# Patient Record
Sex: FEMALE | Race: WHITE | HISPANIC OR LATINO | URBAN - METROPOLITAN AREA
[De-identification: names, ages, dates, MRNs, and addresses within clinical notes are randomized per-mention and may not be internally consistent; named-entity substitution may affect disease eponyms.]

---

## 2022-09-14 ENCOUNTER — APPOINTMENT (OUTPATIENT)
Dept: ULTRASOUND IMAGING | Facility: CLINIC | Age: 57
DRG: 418 | End: 2022-09-14
Attending: EMERGENCY MEDICINE
Payer: COMMERCIAL

## 2022-09-14 ENCOUNTER — OFFICE VISIT (OUTPATIENT)
Dept: URGENT CARE | Facility: URGENT CARE | Age: 57
End: 2022-09-14
Payer: COMMERCIAL

## 2022-09-14 ENCOUNTER — HOSPITAL ENCOUNTER (INPATIENT)
Facility: CLINIC | Age: 57
LOS: 3 days | Discharge: HOME | DRG: 418 | End: 2022-09-17
Attending: EMERGENCY MEDICINE | Admitting: HOSPITALIST
Payer: COMMERCIAL

## 2022-09-14 VITALS — SYSTOLIC BLOOD PRESSURE: 138 MMHG | OXYGEN SATURATION: 98 % | DIASTOLIC BLOOD PRESSURE: 84 MMHG | HEART RATE: 63 BPM

## 2022-09-14 DIAGNOSIS — G89.18 ACUTE POST-OPERATIVE PAIN: Primary | ICD-10-CM

## 2022-09-14 DIAGNOSIS — R10.13 ABDOMINAL PAIN, EPIGASTRIC: Primary | ICD-10-CM

## 2022-09-14 DIAGNOSIS — K80.50 CHOLEDOCHOLITHIASIS: ICD-10-CM

## 2022-09-14 DIAGNOSIS — K81.0 ACUTE CHOLECYSTITIS: ICD-10-CM

## 2022-09-14 LAB
ALBUMIN SERPL-MCNC: 3.3 G/DL (ref 3.4–5)
ALBUMIN SERPL-MCNC: 3.9 G/DL (ref 3.4–5)
ALBUMIN UR-MCNC: NEGATIVE MG/DL
ALP SERPL-CCNC: 172 U/L (ref 40–150)
ALP SERPL-CCNC: 203 U/L (ref 40–150)
ALT SERPL W P-5'-P-CCNC: 566 U/L (ref 0–50)
ALT SERPL W P-5'-P-CCNC: 675 U/L (ref 0–50)
ANION GAP SERPL CALCULATED.3IONS-SCNC: 6 MMOL/L (ref 3–14)
ANION GAP SERPL CALCULATED.3IONS-SCNC: 6 MMOL/L (ref 3–14)
APPEARANCE UR: CLEAR
AST SERPL W P-5'-P-CCNC: 220 U/L (ref 0–45)
AST SERPL W P-5'-P-CCNC: 277 U/L (ref 0–45)
BACTERIA #/AREA URNS HPF: ABNORMAL /HPF
BASOPHILS # BLD AUTO: 0 10E3/UL (ref 0–0.2)
BASOPHILS NFR BLD AUTO: 0 %
BILIRUB SERPL-MCNC: 5.6 MG/DL (ref 0.2–1.3)
BILIRUB SERPL-MCNC: 5.7 MG/DL (ref 0.2–1.3)
BILIRUB UR QL STRIP: ABNORMAL
BUN SERPL-MCNC: 17 MG/DL (ref 7–30)
BUN SERPL-MCNC: 19 MG/DL (ref 7–30)
CALCIUM SERPL-MCNC: 8.7 MG/DL (ref 8.5–10.1)
CALCIUM SERPL-MCNC: 9.5 MG/DL (ref 8.5–10.1)
CHLORIDE BLD-SCNC: 101 MMOL/L (ref 94–109)
CHLORIDE BLD-SCNC: 105 MMOL/L (ref 94–109)
CO2 SERPL-SCNC: 28 MMOL/L (ref 20–32)
CO2 SERPL-SCNC: 29 MMOL/L (ref 20–32)
COLOR UR AUTO: YELLOW
CREAT SERPL-MCNC: 1.32 MG/DL (ref 0.52–1.04)
CREAT SERPL-MCNC: 1.35 MG/DL (ref 0.52–1.04)
EOSINOPHIL # BLD AUTO: 0.2 10E3/UL (ref 0–0.7)
EOSINOPHIL NFR BLD AUTO: 2 %
ERYTHROCYTE [DISTWIDTH] IN BLOOD BY AUTOMATED COUNT: 13.7 % (ref 10–15)
GFR SERPL CREATININE-BSD FRML MDRD: 46 ML/MIN/1.73M2
GFR SERPL CREATININE-BSD FRML MDRD: 47 ML/MIN/1.73M2
GLUCOSE BLD-MCNC: 101 MG/DL (ref 70–99)
GLUCOSE BLD-MCNC: 114 MG/DL (ref 70–99)
GLUCOSE UR STRIP-MCNC: NEGATIVE MG/DL
HCT VFR BLD AUTO: 43.5 % (ref 35–47)
HGB BLD-MCNC: 14.4 G/DL (ref 11.7–15.7)
HGB UR QL STRIP: ABNORMAL
HOLD SPECIMEN: NORMAL
KETONES UR STRIP-MCNC: NEGATIVE MG/DL
LEUKOCYTE ESTERASE UR QL STRIP: NEGATIVE
LIPASE SERPL-CCNC: 117 U/L (ref 73–393)
LYMPHOCYTES # BLD AUTO: 1.6 10E3/UL (ref 0.8–5.3)
LYMPHOCYTES NFR BLD AUTO: 20 %
MCH RBC QN AUTO: 29.4 PG (ref 26.5–33)
MCHC RBC AUTO-ENTMCNC: 33.1 G/DL (ref 31.5–36.5)
MCV RBC AUTO: 89 FL (ref 78–100)
MONOCYTES # BLD AUTO: 0.6 10E3/UL (ref 0–1.3)
MONOCYTES NFR BLD AUTO: 8 %
NEUTROPHILS # BLD AUTO: 5.5 10E3/UL (ref 1.6–8.3)
NEUTROPHILS NFR BLD AUTO: 70 %
NITRATE UR QL: NEGATIVE
PH UR STRIP: 5.5 [PH] (ref 5–7)
PLATELET # BLD AUTO: 261 10E3/UL (ref 150–450)
POTASSIUM BLD-SCNC: 3.1 MMOL/L (ref 3.4–5.3)
POTASSIUM BLD-SCNC: 3.2 MMOL/L (ref 3.4–5.3)
PROT SERPL-MCNC: 7.1 G/DL (ref 6.8–8.8)
PROT SERPL-MCNC: 8.2 G/DL (ref 6.8–8.8)
RBC # BLD AUTO: 4.89 10E6/UL (ref 3.8–5.2)
RBC #/AREA URNS AUTO: ABNORMAL /HPF
SARS-COV-2 RNA RESP QL NAA+PROBE: NEGATIVE
SODIUM SERPL-SCNC: 135 MMOL/L (ref 133–144)
SODIUM SERPL-SCNC: 140 MMOL/L (ref 133–144)
SP GR UR STRIP: <=1.005 (ref 1–1.03)
SQUAMOUS #/AREA URNS AUTO: ABNORMAL /LPF
UROBILINOGEN UR STRIP-ACNC: 0.2 E.U./DL
WBC # BLD AUTO: 7.9 10E3/UL (ref 4–11)
WBC #/AREA URNS AUTO: ABNORMAL /HPF

## 2022-09-14 PROCEDURE — U0005 INFEC AGEN DETEC AMPLI PROBE: HCPCS | Performed by: EMERGENCY MEDICINE

## 2022-09-14 PROCEDURE — 76705 ECHO EXAM OF ABDOMEN: CPT

## 2022-09-14 PROCEDURE — C9803 HOPD COVID-19 SPEC COLLECT: HCPCS

## 2022-09-14 PROCEDURE — 96365 THER/PROPH/DIAG IV INF INIT: CPT

## 2022-09-14 PROCEDURE — 120N000001 HC R&B MED SURG/OB

## 2022-09-14 PROCEDURE — 85025 COMPLETE CBC W/AUTO DIFF WBC: CPT | Performed by: PHYSICIAN ASSISTANT

## 2022-09-14 PROCEDURE — 80053 COMPREHEN METABOLIC PANEL: CPT | Performed by: PHYSICIAN ASSISTANT

## 2022-09-14 PROCEDURE — 96361 HYDRATE IV INFUSION ADD-ON: CPT

## 2022-09-14 PROCEDURE — 258N000003 HC RX IP 258 OP 636: Performed by: EMERGENCY MEDICINE

## 2022-09-14 PROCEDURE — 83690 ASSAY OF LIPASE: CPT | Performed by: EMERGENCY MEDICINE

## 2022-09-14 PROCEDURE — 99285 EMERGENCY DEPT VISIT HI MDM: CPT | Mod: 25

## 2022-09-14 PROCEDURE — 250N000011 HC RX IP 250 OP 636: Performed by: EMERGENCY MEDICINE

## 2022-09-14 PROCEDURE — 36415 COLL VENOUS BLD VENIPUNCTURE: CPT | Performed by: PHYSICIAN ASSISTANT

## 2022-09-14 PROCEDURE — 84155 ASSAY OF PROTEIN SERUM: CPT | Performed by: EMERGENCY MEDICINE

## 2022-09-14 PROCEDURE — 99207 PR INPT ADMISSION FROM CLINIC: CPT | Performed by: PHYSICIAN ASSISTANT

## 2022-09-14 PROCEDURE — 99223 1ST HOSP IP/OBS HIGH 75: CPT | Mod: AI | Performed by: HOSPITALIST

## 2022-09-14 PROCEDURE — 36415 COLL VENOUS BLD VENIPUNCTURE: CPT | Performed by: EMERGENCY MEDICINE

## 2022-09-14 PROCEDURE — 81001 URINALYSIS AUTO W/SCOPE: CPT | Performed by: PHYSICIAN ASSISTANT

## 2022-09-14 PROCEDURE — 93000 ELECTROCARDIOGRAM COMPLETE: CPT | Performed by: PHYSICIAN ASSISTANT

## 2022-09-14 RX ORDER — PIPERACILLIN SODIUM, TAZOBACTAM SODIUM 3; .375 G/15ML; G/15ML
3.38 INJECTION, POWDER, LYOPHILIZED, FOR SOLUTION INTRAVENOUS ONCE
Status: COMPLETED | OUTPATIENT
Start: 2022-09-14 | End: 2022-09-14

## 2022-09-14 RX ORDER — SODIUM CHLORIDE 9 MG/ML
INJECTION, SOLUTION INTRAVENOUS CONTINUOUS
Status: DISCONTINUED | OUTPATIENT
Start: 2022-09-14 | End: 2022-09-15 | Stop reason: DRUGHIGH

## 2022-09-14 RX ORDER — LATANOPROST 50 UG/ML
1 SOLUTION/ DROPS OPHTHALMIC AT BEDTIME
COMMUNITY

## 2022-09-14 RX ORDER — LOSARTAN POTASSIUM AND HYDROCHLOROTHIAZIDE 12.5; 5 MG/1; MG/1
1 TABLET ORAL DAILY
Status: ON HOLD | COMMUNITY
Start: 2022-08-10 | End: 2022-09-17

## 2022-09-14 RX ADMIN — PIPERACILLIN AND TAZOBACTAM 3.38 G: 3; .375 INJECTION, POWDER, FOR SOLUTION INTRAVENOUS at 22:02

## 2022-09-14 RX ADMIN — SODIUM CHLORIDE: 9 INJECTION, SOLUTION INTRAVENOUS at 23:25

## 2022-09-14 RX ADMIN — SODIUM CHLORIDE 1000 ML: 9 INJECTION, SOLUTION INTRAVENOUS at 21:13

## 2022-09-14 ASSESSMENT — ENCOUNTER SYMPTOMS
SORE THROAT: 0
ABDOMINAL PAIN: 1
NAUSEA: 0
FEVER: 0
VOMITING: 0
BLOOD IN STOOL: 0
COUGH: 0

## 2022-09-14 ASSESSMENT — ACTIVITIES OF DAILY LIVING (ADL)
ADLS_ACUITY_SCORE: 35
ADLS_ACUITY_SCORE: 33

## 2022-09-14 NOTE — PROGRESS NOTES
SUBJECTIVE  HPI:  Carla Jennings is a 57 year old female who presents with the CC of abdominal/pelvic pain.    Pain is located in the epigastric area, with radiation to RUQ and LUQ.  The pain is characterized as burning, and at worst is a level 7 on a scale of 1-10.  Pain has been present for 3 day(s) and is stable.  EXACERBATING FACTORS: eating  RELIEVING FACTORS: nothing.  ASSOCIATED SX: none.    No past medical history on file.  Current Outpatient Medications   Medication Sig Dispense Refill     losartan-hydrochlorothiazide (HYZAAR) 50-12.5 MG tablet Take 1 tablet by mouth daily       Social History     Tobacco Use     Smoking status: Not on file     Smokeless tobacco: Not on file   Substance Use Topics     Alcohol use: Not on file       ROS:  Review of systems negative except as stated above.    OBJECTIVE:  /84 (BP Location: Left arm, Patient Position: Sitting, Cuff Size: Adult Regular)   Pulse 63   SpO2 98%   GENERAL APPEARANCE: healthy, alert and no distress  EYES: EOMI,  PERRL, conjunctiva clear  HENT: ear canals and TM's normal.  Nose and mouth without ulcers, erythema or lesions  NECK: supple, nontender, no lymphadenopathy  RESP: lungs clear to auscultation - no rales, rhonchi or wheezes  CV: regular rates and rhythm, normal S1 S2, no murmur noted  ABDOMEN:  RUQ guarding.  Otherwise soft, nontender, no HSM or masses and bowel sounds normal  NEURO: Normal strength and tone, sensory exam grossly normal,  normal speech and mentation  SKIN: no suspicious lesions or rashes      Results for orders placed or performed in visit on 09/14/22   UA macro with reflex to Microscopic and Culture - Clinc Collect     Status: Abnormal    Specimen: Urine, Midstream   Result Value Ref Range    Color Urine Yellow Colorless, Straw, Light Yellow, Yellow    Appearance Urine Clear Clear    Glucose Urine Negative Negative mg/dL    Bilirubin Urine Moderate (A) Negative    Ketones Urine Negative Negative mg/dL    Specific  Gravity Urine <=1.005 1.003 - 1.035    Blood Urine Trace (A) Negative    pH Urine 5.5 5.0 - 7.0    Protein Albumin Urine Negative Negative mg/dL    Urobilinogen Urine 0.2 0.2, 1.0 E.U./dL    Nitrite Urine Negative Negative    Leukocyte Esterase Urine Negative Negative   Comprehensive metabolic panel (BMP + Alb, Alk Phos, ALT, AST, Total. Bili, TP)     Status: Abnormal   Result Value Ref Range    Sodium 135 133 - 144 mmol/L    Potassium 3.2 (L) 3.4 - 5.3 mmol/L    Chloride 101 94 - 109 mmol/L    Carbon Dioxide (CO2) 28 20 - 32 mmol/L    Anion Gap 6 3 - 14 mmol/L    Urea Nitrogen 17 7 - 30 mg/dL    Creatinine 1.32 (H) 0.52 - 1.04 mg/dL    Calcium 9.5 8.5 - 10.1 mg/dL    Glucose 101 (H) 70 - 99 mg/dL    Alkaline Phosphatase 203 (H) 40 - 150 U/L     (H) 0 - 45 U/L     (HH) 0 - 50 U/L    Protein Total 8.2 6.8 - 8.8 g/dL    Albumin 3.9 3.4 - 5.0 g/dL    Bilirubin Total 5.7 (H) 0.2 - 1.3 mg/dL    GFR Estimate 47 (L) >60 mL/min/1.73m2   CBC with platelets and differential     Status: None   Result Value Ref Range    WBC Count 7.9 4.0 - 11.0 10e3/uL    RBC Count 4.89 3.80 - 5.20 10e6/uL    Hemoglobin 14.4 11.7 - 15.7 g/dL    Hematocrit 43.5 35.0 - 47.0 %    MCV 89 78 - 100 fL    MCH 29.4 26.5 - 33.0 pg    MCHC 33.1 31.5 - 36.5 g/dL    RDW 13.7 10.0 - 15.0 %    Platelet Count 261 150 - 450 10e3/uL    % Neutrophils 70 %    % Lymphocytes 20 %    % Monocytes 8 %    % Eosinophils 2 %    % Basophils 0 %    Absolute Neutrophils 5.5 1.6 - 8.3 10e3/uL    Absolute Lymphocytes 1.6 0.8 - 5.3 10e3/uL    Absolute Monocytes 0.6 0.0 - 1.3 10e3/uL    Absolute Eosinophils 0.2 0.0 - 0.7 10e3/uL    Absolute Basophils 0.0 0.0 - 0.2 10e3/uL   Urine Microscopic     Status: Abnormal   Result Value Ref Range    Bacteria Urine Few (A) None Seen /HPF    RBC Urine 0-2 0-2 /HPF /HPF    WBC Urine 0-5 0-5 /HPF /HPF    Squamous Epithelials Urine Moderate (A) None Seen /LPF    Narrative    Urine Culture not indicated   CBC with platelets and  differential     Status: None    Narrative    The following orders were created for panel order CBC with platelets and differential.  Procedure                               Abnormality         Status                     ---------                               -----------         ------                     CBC with platelets and d...[[id number]]                      Final result                 Please view results for these tests on the individual orders.       ASSESSMENT:    (R10.13) Abdominal pain, epigastric  (primary encounter diagnosis)  Comment: multiple abnormalities in lab work indicating further assessment  Plan: lidocaine (viscous) (XYLOCAINE) 2 % 15 mL, alum        & mag hydroxide-simethicone (MAALOX) 15 mL GI         Cocktail, EKG 12-lead complete w/read -         Clinics, UA macro with reflex to Microscopic         and Culture - Clinc Collect, CBC with platelets        and differential, Comprehensive metabolic panel        (BMP + Alb, Alk Phos, ALT, AST, Total. Bili,         TP), Urine Microscopic      Sent to ED with  by private vehicle

## 2022-09-14 NOTE — PROGRESS NOTES
Clinic Administered Medication Documentation    Administrations This Visit     lidocaine (viscous) (XYLOCAINE) 2 % 15 mL, alum & mag hydroxide-simethicone (MAALOX) 15 mL GI Cocktail     Admin Date  09/14/2022 Action  Given Dose  30 mL Route  Oral Site   Administered By  Braulio Brown CMA    Ordering Provider: Edgardo Snider PA-C    NDC: 2301-3852-93, 85579-647-93    Lot#: [id number], [id number]    : CHUY RENTERIA    Patient Supplied?: No

## 2022-09-14 NOTE — ED TRIAGE NOTES
Pt seen at Urgent Care & sent here for abnormal labs. Pt has been having epigastric pain since Saturday. Pt was given GI cocktail for pain and bloodwork was drawn. Pt pain at this time 2/10. Pt hx of HTN      Triage Assessment     Row Name 09/14/22 1006       Triage Assessment (Adult)    Airway WDL WDL       Respiratory WDL    Respiratory WDL WDL       Skin Circulation/Temperature WDL    Skin Circulation/Temperature WDL WDL       Cardiac WDL    Cardiac WDL WDL       Peripheral/Neurovascular WDL    Peripheral Neurovascular WDL WDL       Cognitive/Neuro/Behavioral WDL    Cognitive/Neuro/Behavioral WDL WDL

## 2022-09-15 ENCOUNTER — ANESTHESIA EVENT (OUTPATIENT)
Dept: SURGERY | Facility: CLINIC | Age: 57
DRG: 418 | End: 2022-09-15
Payer: COMMERCIAL

## 2022-09-15 ENCOUNTER — ANESTHESIA (OUTPATIENT)
Dept: SURGERY | Facility: CLINIC | Age: 57
DRG: 418 | End: 2022-09-15
Payer: COMMERCIAL

## 2022-09-15 ENCOUNTER — APPOINTMENT (OUTPATIENT)
Dept: GENERAL RADIOLOGY | Facility: CLINIC | Age: 57
DRG: 418 | End: 2022-09-15
Attending: INTERNAL MEDICINE
Payer: COMMERCIAL

## 2022-09-15 LAB
ALBUMIN SERPL-MCNC: 3.2 G/DL (ref 3.4–5)
ALP SERPL-CCNC: 172 U/L (ref 40–150)
ALT SERPL W P-5'-P-CCNC: 532 U/L (ref 0–50)
ANION GAP SERPL CALCULATED.3IONS-SCNC: 6 MMOL/L (ref 3–14)
AST SERPL W P-5'-P-CCNC: 204 U/L (ref 0–45)
BILIRUB DIRECT SERPL-MCNC: 4.6 MG/DL (ref 0–0.2)
BILIRUB SERPL-MCNC: 5.9 MG/DL (ref 0.2–1.3)
BUN SERPL-MCNC: 18 MG/DL (ref 7–30)
CALCIUM SERPL-MCNC: 8.7 MG/DL (ref 8.5–10.1)
CHLORIDE BLD-SCNC: 110 MMOL/L (ref 94–109)
CO2 SERPL-SCNC: 24 MMOL/L (ref 20–32)
CREAT SERPL-MCNC: 1.35 MG/DL (ref 0.52–1.04)
ERYTHROCYTE [DISTWIDTH] IN BLOOD BY AUTOMATED COUNT: 13.8 % (ref 10–15)
GFR SERPL CREATININE-BSD FRML MDRD: 46 ML/MIN/1.73M2
GLUCOSE BLD-MCNC: 97 MG/DL (ref 70–99)
HCT VFR BLD AUTO: 39.8 % (ref 35–47)
HGB BLD-MCNC: 13.2 G/DL (ref 11.7–15.7)
MCH RBC QN AUTO: 29.2 PG (ref 26.5–33)
MCHC RBC AUTO-ENTMCNC: 33.2 G/DL (ref 31.5–36.5)
MCV RBC AUTO: 88 FL (ref 78–100)
PLATELET # BLD AUTO: 244 10E3/UL (ref 150–450)
POTASSIUM BLD-SCNC: 3.7 MMOL/L (ref 3.4–5.3)
POTASSIUM BLD-SCNC: 3.8 MMOL/L (ref 3.4–5.3)
PROT SERPL-MCNC: 7 G/DL (ref 6.8–8.8)
RBC # BLD AUTO: 4.52 10E6/UL (ref 3.8–5.2)
SODIUM SERPL-SCNC: 140 MMOL/L (ref 133–144)
WBC # BLD AUTO: 6.2 10E3/UL (ref 4–11)

## 2022-09-15 PROCEDURE — 250N000011 HC RX IP 250 OP 636: Performed by: PHYSICIAN ASSISTANT

## 2022-09-15 PROCEDURE — 360N000083 HC SURGERY LEVEL 3 W/ FLUORO, PER MIN: Performed by: INTERNAL MEDICINE

## 2022-09-15 PROCEDURE — 82248 BILIRUBIN DIRECT: CPT | Performed by: HOSPITALIST

## 2022-09-15 PROCEDURE — 250N000013 HC RX MED GY IP 250 OP 250 PS 637: Performed by: HOSPITALIST

## 2022-09-15 PROCEDURE — C1726 CATH, BAL DIL, NON-VASCULAR: HCPCS | Performed by: INTERNAL MEDICINE

## 2022-09-15 PROCEDURE — 250N000011 HC RX IP 250 OP 636: Performed by: HOSPITALIST

## 2022-09-15 PROCEDURE — 99222 1ST HOSP IP/OBS MODERATE 55: CPT | Mod: 57 | Performed by: SURGERY

## 2022-09-15 PROCEDURE — C2617 STENT, NON-COR, TEM W/O DEL: HCPCS | Performed by: INTERNAL MEDICINE

## 2022-09-15 PROCEDURE — C9113 INJ PANTOPRAZOLE SODIUM, VIA: HCPCS | Performed by: PHYSICIAN ASSISTANT

## 2022-09-15 PROCEDURE — 999N000141 HC STATISTIC PRE-PROCEDURE NURSING ASSESSMENT: Performed by: INTERNAL MEDICINE

## 2022-09-15 PROCEDURE — 82310 ASSAY OF CALCIUM: CPT | Performed by: HOSPITALIST

## 2022-09-15 PROCEDURE — 0FHB8DZ INSERTION OF INTRALUMINAL DEVICE INTO HEPATOBILIARY DUCT, VIA NATURAL OR ARTIFICIAL OPENING ENDOSCOPIC: ICD-10-PCS | Performed by: INTERNAL MEDICINE

## 2022-09-15 PROCEDURE — 258N000003 HC RX IP 258 OP 636: Performed by: NURSE ANESTHETIST, CERTIFIED REGISTERED

## 2022-09-15 PROCEDURE — 370N000017 HC ANESTHESIA TECHNICAL FEE, PER MIN: Performed by: INTERNAL MEDICINE

## 2022-09-15 PROCEDURE — 255N000002 HC RX 255 OP 636: Performed by: INTERNAL MEDICINE

## 2022-09-15 PROCEDURE — 250N000009 HC RX 250: Performed by: NURSE ANESTHETIST, CERTIFIED REGISTERED

## 2022-09-15 PROCEDURE — 272N000001 HC OR GENERAL SUPPLY STERILE: Performed by: INTERNAL MEDICINE

## 2022-09-15 PROCEDURE — 120N000001 HC R&B MED SURG/OB

## 2022-09-15 PROCEDURE — 710N000009 HC RECOVERY PHASE 1, LEVEL 1, PER MIN: Performed by: INTERNAL MEDICINE

## 2022-09-15 PROCEDURE — 99232 SBSQ HOSP IP/OBS MODERATE 35: CPT | Performed by: INTERNAL MEDICINE

## 2022-09-15 PROCEDURE — 250N000011 HC RX IP 250 OP 636: Performed by: NURSE ANESTHETIST, CERTIFIED REGISTERED

## 2022-09-15 PROCEDURE — 258N000003 HC RX IP 258 OP 636: Performed by: HOSPITALIST

## 2022-09-15 PROCEDURE — 47562 LAPAROSCOPIC CHOLECYSTECTOMY: CPT | Mod: AS | Performed by: PHYSICIAN ASSISTANT

## 2022-09-15 PROCEDURE — 36415 COLL VENOUS BLD VENIPUNCTURE: CPT | Performed by: HOSPITALIST

## 2022-09-15 PROCEDURE — 74330 X-RAY BILE/PANC ENDOSCOPY: CPT

## 2022-09-15 PROCEDURE — 84132 ASSAY OF SERUM POTASSIUM: CPT | Performed by: HOSPITALIST

## 2022-09-15 PROCEDURE — 85027 COMPLETE CBC AUTOMATED: CPT | Performed by: HOSPITALIST

## 2022-09-15 PROCEDURE — 0FC98ZZ EXTIRPATION OF MATTER FROM COMMON BILE DUCT, VIA NATURAL OR ARTIFICIAL OPENING ENDOSCOPIC: ICD-10-PCS | Performed by: INTERNAL MEDICINE

## 2022-09-15 DEVICE — BILIARY STENT WITH NAVIFLEXTM RX DELIVERY SYSTEM
Type: IMPLANTABLE DEVICE | Site: BILE DUCT | Status: NON-FUNCTIONAL
Brand: ADVANIX™ BILIARY
Removed: 2023-04-17

## 2022-09-15 RX ORDER — ONDANSETRON 2 MG/ML
INJECTION INTRAMUSCULAR; INTRAVENOUS PRN
Status: DISCONTINUED | OUTPATIENT
Start: 2022-09-15 | End: 2022-09-15

## 2022-09-15 RX ORDER — NALOXONE HYDROCHLORIDE 0.4 MG/ML
0.4 INJECTION, SOLUTION INTRAMUSCULAR; INTRAVENOUS; SUBCUTANEOUS
Status: DISCONTINUED | OUTPATIENT
Start: 2022-09-15 | End: 2022-09-17 | Stop reason: HOSPADM

## 2022-09-15 RX ORDER — SODIUM CHLORIDE 9 MG/ML
INJECTION, SOLUTION INTRAVENOUS CONTINUOUS PRN
Status: DISCONTINUED | OUTPATIENT
Start: 2022-09-15 | End: 2022-09-15

## 2022-09-15 RX ORDER — LABETALOL HYDROCHLORIDE 5 MG/ML
INJECTION, SOLUTION INTRAVENOUS PRN
Status: DISCONTINUED | OUTPATIENT
Start: 2022-09-15 | End: 2022-09-15

## 2022-09-15 RX ORDER — NALOXONE HYDROCHLORIDE 0.4 MG/ML
0.2 INJECTION, SOLUTION INTRAMUSCULAR; INTRAVENOUS; SUBCUTANEOUS
Status: DISCONTINUED | OUTPATIENT
Start: 2022-09-15 | End: 2022-09-17 | Stop reason: HOSPADM

## 2022-09-15 RX ORDER — HYDROMORPHONE HCL IN WATER/PF 6 MG/30 ML
0.2 PATIENT CONTROLLED ANALGESIA SYRINGE INTRAVENOUS
Status: DISCONTINUED | OUTPATIENT
Start: 2022-09-15 | End: 2022-09-17 | Stop reason: HOSPADM

## 2022-09-15 RX ORDER — DEXAMETHASONE SODIUM PHOSPHATE 4 MG/ML
INJECTION, SOLUTION INTRA-ARTICULAR; INTRALESIONAL; INTRAMUSCULAR; INTRAVENOUS; SOFT TISSUE PRN
Status: DISCONTINUED | OUTPATIENT
Start: 2022-09-15 | End: 2022-09-15

## 2022-09-15 RX ORDER — DEXMEDETOMIDINE HYDROCHLORIDE 4 UG/ML
INJECTION, SOLUTION INTRAVENOUS PRN
Status: DISCONTINUED | OUTPATIENT
Start: 2022-09-15 | End: 2022-09-15

## 2022-09-15 RX ORDER — SODIUM CHLORIDE 9 MG/ML
INJECTION, SOLUTION INTRAVENOUS CONTINUOUS
Status: DISCONTINUED | OUTPATIENT
Start: 2022-09-15 | End: 2022-09-17 | Stop reason: HOSPADM

## 2022-09-15 RX ORDER — LIDOCAINE 40 MG/G
CREAM TOPICAL
Status: DISCONTINUED | OUTPATIENT
Start: 2022-09-15 | End: 2022-09-17 | Stop reason: HOSPADM

## 2022-09-15 RX ORDER — PIPERACILLIN SODIUM, TAZOBACTAM SODIUM 3; .375 G/15ML; G/15ML
3.38 INJECTION, POWDER, LYOPHILIZED, FOR SOLUTION INTRAVENOUS EVERY 6 HOURS
Status: DISCONTINUED | OUTPATIENT
Start: 2022-09-15 | End: 2022-09-17 | Stop reason: HOSPADM

## 2022-09-15 RX ORDER — POTASSIUM CHLORIDE 1500 MG/1
40 TABLET, EXTENDED RELEASE ORAL ONCE
Status: COMPLETED | OUTPATIENT
Start: 2022-09-15 | End: 2022-09-15

## 2022-09-15 RX ORDER — ONDANSETRON 4 MG/1
4 TABLET, ORALLY DISINTEGRATING ORAL EVERY 6 HOURS PRN
Status: DISCONTINUED | OUTPATIENT
Start: 2022-09-15 | End: 2022-09-17 | Stop reason: HOSPADM

## 2022-09-15 RX ORDER — ONDANSETRON 2 MG/ML
4 INJECTION INTRAMUSCULAR; INTRAVENOUS EVERY 6 HOURS PRN
Status: DISCONTINUED | OUTPATIENT
Start: 2022-09-15 | End: 2022-09-17 | Stop reason: HOSPADM

## 2022-09-15 RX ORDER — PROPOFOL 10 MG/ML
INJECTION, EMULSION INTRAVENOUS CONTINUOUS PRN
Status: DISCONTINUED | OUTPATIENT
Start: 2022-09-15 | End: 2022-09-15

## 2022-09-15 RX ORDER — LANOLIN ALCOHOL/MO/W.PET/CERES
3 CREAM (GRAM) TOPICAL
Status: DISCONTINUED | OUTPATIENT
Start: 2022-09-15 | End: 2022-09-17 | Stop reason: HOSPADM

## 2022-09-15 RX ORDER — IOPAMIDOL 612 MG/ML
INJECTION, SOLUTION INTRAVASCULAR PRN
Status: DISCONTINUED | OUTPATIENT
Start: 2022-09-15 | End: 2022-09-15 | Stop reason: HOSPADM

## 2022-09-15 RX ORDER — LIDOCAINE HYDROCHLORIDE 20 MG/ML
INJECTION, SOLUTION INFILTRATION; PERINEURAL PRN
Status: DISCONTINUED | OUTPATIENT
Start: 2022-09-15 | End: 2022-09-15

## 2022-09-15 RX ORDER — PROPOFOL 10 MG/ML
INJECTION, EMULSION INTRAVENOUS PRN
Status: DISCONTINUED | OUTPATIENT
Start: 2022-09-15 | End: 2022-09-15

## 2022-09-15 RX ORDER — LATANOPROST 50 UG/ML
1 SOLUTION/ DROPS OPHTHALMIC AT BEDTIME
Status: DISCONTINUED | OUTPATIENT
Start: 2022-09-15 | End: 2022-09-17 | Stop reason: HOSPADM

## 2022-09-15 RX ADMIN — PROPOFOL 25 MG: 10 INJECTION, EMULSION INTRAVENOUS at 21:12

## 2022-09-15 RX ADMIN — LIDOCAINE HYDROCHLORIDE 40 MG: 20 INJECTION, SOLUTION INFILTRATION; PERINEURAL at 21:12

## 2022-09-15 RX ADMIN — SODIUM CHLORIDE: 9 INJECTION, SOLUTION INTRAVENOUS at 18:45

## 2022-09-15 RX ADMIN — POTASSIUM CHLORIDE 40 MEQ: 1500 TABLET, EXTENDED RELEASE ORAL at 05:40

## 2022-09-15 RX ADMIN — MIDAZOLAM 1 MG: 1 INJECTION INTRAMUSCULAR; INTRAVENOUS at 21:08

## 2022-09-15 RX ADMIN — DEXAMETHASONE SODIUM PHOSPHATE 4 MG: 4 INJECTION, SOLUTION INTRA-ARTICULAR; INTRALESIONAL; INTRAMUSCULAR; INTRAVENOUS; SOFT TISSUE at 21:24

## 2022-09-15 RX ADMIN — ONDANSETRON 4 MG: 2 INJECTION INTRAMUSCULAR; INTRAVENOUS at 21:24

## 2022-09-15 RX ADMIN — PROPOFOL 150 MCG/KG/MIN: 10 INJECTION, EMULSION INTRAVENOUS at 21:12

## 2022-09-15 RX ADMIN — MIDAZOLAM 1 MG: 1 INJECTION INTRAMUSCULAR; INTRAVENOUS at 21:11

## 2022-09-15 RX ADMIN — TOPICAL ANESTHETIC 1 SPRAY: 200 SPRAY DENTAL; PERIODONTAL at 21:05

## 2022-09-15 RX ADMIN — DEXMEDETOMIDINE HYDROCHLORIDE 4 MCG: 200 INJECTION INTRAVENOUS at 21:10

## 2022-09-15 RX ADMIN — PIPERACILLIN AND TAZOBACTAM 3.38 G: 3; .375 INJECTION, POWDER, FOR SOLUTION INTRAVENOUS at 20:52

## 2022-09-15 RX ADMIN — SODIUM CHLORIDE: 9 INJECTION, SOLUTION INTRAVENOUS at 02:14

## 2022-09-15 RX ADMIN — LABETALOL HYDROCHLORIDE 5 MG: 5 INJECTION INTRAVENOUS at 21:24

## 2022-09-15 RX ADMIN — LABETALOL HYDROCHLORIDE 5 MG: 5 INJECTION INTRAVENOUS at 21:19

## 2022-09-15 RX ADMIN — PIPERACILLIN AND TAZOBACTAM 3.38 G: 3; .375 INJECTION, POWDER, FOR SOLUTION INTRAVENOUS at 08:17

## 2022-09-15 RX ADMIN — PANTOPRAZOLE SODIUM 40 MG: 40 INJECTION, POWDER, FOR SOLUTION INTRAVENOUS at 09:55

## 2022-09-15 RX ADMIN — PIPERACILLIN AND TAZOBACTAM 3.38 G: 3; .375 INJECTION, POWDER, FOR SOLUTION INTRAVENOUS at 13:30

## 2022-09-15 RX ADMIN — PIPERACILLIN AND TAZOBACTAM 3.38 G: 3; .375 INJECTION, POWDER, FOR SOLUTION INTRAVENOUS at 02:13

## 2022-09-15 RX ADMIN — SODIUM CHLORIDE: 9 INJECTION, SOLUTION INTRAVENOUS at 20:54

## 2022-09-15 ASSESSMENT — ACTIVITIES OF DAILY LIVING (ADL)
ADLS_ACUITY_SCORE: 18
ADLS_ACUITY_SCORE: 18
TOILETING_ISSUES: NO
DOING_ERRANDS_INDEPENDENTLY_DIFFICULTY: NO
DRESSING/BATHING_DIFFICULTY: NO
ADLS_ACUITY_SCORE: 18
ADLS_ACUITY_SCORE: 18
ADLS_ACUITY_SCORE: 35
ADLS_ACUITY_SCORE: 18
WALKING_OR_CLIMBING_STAIRS_DIFFICULTY: NO
ADLS_ACUITY_SCORE: 35
FALL_HISTORY_WITHIN_LAST_SIX_MONTHS: NO
ADLS_ACUITY_SCORE: 18
ADLS_ACUITY_SCORE: 18
CONCENTRATING,_REMEMBERING_OR_MAKING_DECISIONS_DIFFICULTY: NO
ADLS_ACUITY_SCORE: 18
ADLS_ACUITY_SCORE: 18
WEAR_GLASSES_OR_BLIND: NO
CHANGE_IN_FUNCTIONAL_STATUS_SINCE_ONSET_OF_CURRENT_ILLNESS/INJURY: NO
ADLS_ACUITY_SCORE: 18
DIFFICULTY_EATING/SWALLOWING: NO

## 2022-09-15 NOTE — UTILIZATION REVIEW
Admission Status; Secondary Review Determination    Under the authority of the Utilization Management Committee, the utilization review process indicated a secondary review on the above patient. The review outcome is based on review of the medical records, discussions with staff, and applying clinical experience noted on the date of the review.    (x) Inpatient Status Appropriate - This patient's medical care is consistent with medical management for inpatient care and reasonable inpatient medical practice.    RATIONALE FOR DETERMINATION: 57-year-old female presented to the hospital with acute cholecystitis and choledocholithiasis.  Patient requiring IV pain control, broad-spectrum IV antibiotics, IV fluids and sequential procedures including EUS with ERCP in addition to a cholecystectomy.  Due to the acute cholecystitis complicated by common bile duct stones with obstructive findings, inpatient management appropriate.    At the time of admission with the information available to the attending physician more than 2 nights Hospital complex care was anticipated, based on patient risk of adverse outcome if treated as outpatient and complex care required. Inpatient admission is appropriate based on the Medicare guidelines.    This document was produced using voice recognition software    The information on this document is developed by the utilization review team in order for the business office to ensure compliance. This only denotes the appropriateness of proper admission status and does not reflect the quality of care rendered.    The definitions of Inpatient Status and Observation Status used in making the determination above are those provided in the CMS Coverage Manual, Chapter 1 and Chapter 6, section 70.4.    Sincerely,    Luisito Savage MD  Utilization Review  Physician Advisor  [facility].

## 2022-09-15 NOTE — ED NOTES
.  [facility]  ED Nurse Handoff Report    ED Chief complaint: Abdominal Pain      ED Diagnosis:   Final diagnoses:   Choledocholithiasis   Acute cholecystitis       Code Status: Full Code    Allergies:   Allergies   Allergen Reactions     Aspirin Rash       Patient Story: Pt. Complains of upper abdominal pain since Saturday. Pt. Stated that she was seen today in urgent care, labs were taken and told to report to the ED.   Focused Assessment:  Pt. Complains of epigastric pain x 4 days, denies n/v at this time.     Treatments and/or interventions provided: IV fluids, zosyn   Patient's response to treatments and/or interventions: No complaints at this time    To be done/followed up on inpatient unit:  continue to monitor    Does this patient have any cognitive concerns?: No    Activity level - Baseline/Home:  Independent  Activity Level - Current:   Independent    Patient's Preferred language: English   Needed?: No    Isolation: None  Infection: Not Applicable  Patient tested for COVID 19 prior to admission: YES  Bariatric?: No    Vital Signs:   Vitals:    09/14/22 1835 09/14/22 2200   BP: (!) 167/96 (!) 155/74   Pulse: 64 57   Resp: 18 20   Temp: 98  F (36.7  C)    TempSrc: Oral    SpO2: 97% 99%       Cardiac Rhythm:     Was the PSS-3 completed:   Yes  What interventions are required if any?                 For the majority of the shift this patient's behavior was Green.   Behavioral interventions performed were n/a.    ED NURSE PHONE NUMBER: [phone]

## 2022-09-15 NOTE — PROGRESS NOTES
Pt A&O x4, up inde, VSS. RA. NPO. IV infusing with intermittent abx. Denied pain, denied n/v. Awaiting for Endoscopy. After endoscopy, pt can resume clear liquid diet. Possible surgery tomorrow, NPO after midnight.

## 2022-09-15 NOTE — PHARMACY-ADMISSION MEDICATION HISTORY
Pharmacy Medication History  Admission medication history interview status for the 9/14/2022  admission is complete. See EPIC admission navigator for prior to admission medications     Location of Interview: Patient room  Medication history sources: Patient and Surescripts    Significant changes made to the medication list:  Added: latanoprost    In the past week, patient estimated taking medication this percent of the time: greater than 90%    Additional medication history information:   none    Medication reconciliation completed by provider prior to medication history? No    Time spent in this activity: 15 minutes    Prior to Admission medications    Medication Sig Last Dose Taking? Auth Provider Long Term End Date   latanoprost (XALATAN) 0.005 % ophthalmic solution Place 1 drop into both eyes At Bedtime 9/13/2022 at pm Yes Unknown, Entered By History Yes    losartan-hydrochlorothiazide (HYZAAR) 50-12.5 MG tablet Take 1 tablet by mouth daily 9/14/2022 at am Yes Reported, Patient Yes        The information provided in this note is only as accurate as the sources available at the time of update(s)

## 2022-09-15 NOTE — PROGRESS NOTES
[facility]    Hospitalist Progress Note    Date of Service (when I saw the patient): 09/15/2022    Assessment & Plan   Carla Jennings is a 57 year old female who was admitted on 9/14/2022.  Carla Jennings is a 57 year old female with a past medical history of hypertension admitted to hospital for acute cholecystitis and choledocholithiasis.     Acute cholecystitis  Choledocholithiasis  Patient presenting with worsening abdominal pain present for approximately 5 days located in the epigastrium and the right upper quadrant.  In urgent care was found to have transaminitis as well as an elevated bilirubin.  Patient was sent to the emergency room where she underwent an ultrasound which revealed gallstones as well as possible wall thickening.  The common bile duct was mildly dilated at 0.8 cm  -N.p.o.  -Zosyn  -IV hydration  GI and general surgery consulted. Appreciate assistance     plan EUs today per GI   Cholecystectomy tomorrow     NPO after midnight for lap brenda tomorrow     Hypokalemia  -Replace per protocol     Hypertension  Hold losartan perioperatively              Code Status []Expand by Default     Full Code  DVT ppx: SCDs  Expected length of stay greater than 2 days           Clinically Significant Risk Factors Present on Admission                    # Hypertension: home medication list includes antihypertensive(s)         Disposition: Expected discharge in 2days after cholecytectomy     discussed with bedside RN and patient today     Linette Pittman MD, MD  [phone] (P)      Interval History   As per pt pain improved. Only mild tenderness on palpation of RUQ. No acute issues since yesterday     -Data reviewed today: I reviewed all new labs and imaging results over the last 24 hours. I personally reviewed no images or EKG's today.    Physical Exam   Temp: 98  F (36.7  C) Temp src: Oral BP: 133/82 Pulse: 51   Resp: 18 SpO2: (P) 97 % O2 Device: None (Room air)    Vitals:     09/15/22 0500   Weight: 96.8 kg (213 lb 6.8 oz)     Vital Signs with Ranges  Temp:  [97.8  F (36.6  C)-98  F (36.7  C)] 98  F (36.7  C)  Pulse:  [51-65] 51  Resp:  [16-20] 18  BP: (133-167)/(74-96) 133/82  SpO2:  [97 %-99 %] (P) 97 %  I/O last 3 completed shifts:  In: 1050 [IV Piggyback:1050]  Out: -     Constitutional: Awake, alert, cooperative, no apparent distress  Respiratory: Clear to auscultation bilaterally, no crackles or wheezing  Cardiovascular: Regular rate and rhythm, normal S1 and S2, and no murmur noted  GI: Normal bowel sounds, soft, non-distended, mild tender RUQ   Skin/Integumen: No rashes, no cyanosis, no edema  Other:     Medications     sodium chloride 100 mL/hr at 09/15/22 0214       latanoprost  1 drop Both Eyes At Bedtime     pantoprazole  40 mg Intravenous QAM AC     piperacillin-tazobactam  3.375 g Intravenous Q6H     sodium chloride (PF)  3 mL Intracatheter Q8H       Data   Recent Labs   Lab 09/15/22  1143 09/15/22  0717 09/14/22  2148 09/14/22  1544   WBC  --  6.2  --  7.9   HGB  --  13.2  --  14.4   MCV  --  88  --  89   PLT  --  244  --  261   NA  --  140 140 135   POTASSIUM 3.8 3.7 3.1* 3.2*   CHLORIDE  --  110* 105 101   CO2  --  24 29 28   BUN  --  18 19 17   CR  --  1.35* 1.35* 1.32*   ANIONGAP  --  6 6 6   REJI  --  8.7 8.7 9.5   GLC  --  97 114* 101*   ALBUMIN  --  3.2* 3.3* 3.9   PROTTOTAL  --  7.0 7.1 8.2   BILITOTAL  --  5.9* 5.6* 5.7*   ALKPHOS  --  172* 172* 203*   ALT  --  532* 566* 675*   AST  --  204* 220* 277*   LIPASE  --   --  117  --        Recent Results (from the past 24 hour(s))   US Abdomen Limited (RUQ)    Narrative    EXAM: US ABDOMEN LIMITED  LOCATION: [facility]  DATE/TIME: 9/14/2022 8:57 PM    INDICATION: Abdominal pain. Elevated liver function tests.  COMPARISON: None.  TECHNIQUE: Limited abdominal ultrasound.    FINDINGS:    GALLBLADDER: There is shadowing echogenic material in the region of the gallbladder, likely representing a  gallbladder filled with stones. The gallbladder wall is not well seen, but there may be mild associated gallbladder wall thickening. No   pericholecystic fluid is identified. Negative sonographic Garrett sign.     BILE DUCTS: There is no intrahepatic biliary dilatation. The common duct is mildly dilated at 0.8 cm. Portions of the common duct could not be visualized due to overlying bowel gas.    LIVER: Diffuse fatty infiltration of the liver. No hepatic masses.    RIGHT KIDNEY: A 1.4 cm simple cyst in the upper pole of the right kidney would require no specific follow-up. The kidney demonstrates otherwise unremarkable echogenicity with no hydronephrosis. Normal size.     PANCREAS: A 2.6 x 0.9 cm hypoechoic structure abutting the pancreatic head most likely represents a mildly prominent peripancreatic lymph node. The visualized portions of the pancreas are otherwise unremarkable.    No ascites is seen.      Impression    IMPRESSION:  1. Shadowing echogenic material in the region of the gallbladder likely represents a gallbladder filled with stones. The gallbladder wall is not well seen, but may be mildly thickened as well. Acute cholecystitis could have this appearance. Please note,   if the patient has had a prior cholecystectomy, bowel within the gallbladder fossa could also have this appearance.  2. Hepatic steatosis.  3. The common duct is mildly dilated at 0.8 cm, with no cause identified.  4. Probable mildly prominent peripancreatic lymph node. CT could be performed for further characterization of this finding.

## 2022-09-15 NOTE — CONSULTS
[facility]  Gastroenterology Consultation         Carla Jennings  [address]   [place] [id number]  57 year old female    Admission Date/Time: 9/14/2022  Primary Care Provider: No Ref-Primary, Physician  Referring / Attending Physician: Anurag Douglass    We were asked to see the patient in consultation by Dr. Anurag Douglass for evaluation of choledocholithiasis.      CC: abdominal pain    HPI:  Carla Jennings is a 57 year old female who has past medical history of hypertension whom presented to ED at [facility] from urgent care. She was seen at urgent care for colicky epigastric abdominal pain and noted to have elevated LFTs. She states pain started on 9/10 after eating a pork chop but resolved with aleve and has recurred on multiple occasions over last 5 days. She states pain became quite severe yesterday prompting a visit to the ED. She states she has never had this pain prior to last week. Has had no fever, chills, chest pain, shortness of breath, nausea, vomiting or diarrhea. Her pain has resolved as of yesterday evening. Has not required any pain control.    Her labs reveal; normal CBC, CMP Alk Phos 203->172, ALT/AST ( 675->566, 277->220) Tbili 5.7->5.6. Abdominal ultrasound revealed shadowing of gallbladder suggesting gallbladder filled with stone. Possibly cholecystitis. CBD dilated at 0.8 cm.    ROS: A comprehensive ten point review of systems was negative aside from those in mentioned in the HPI.      PAST MED HX:  I have reviewed this patient's medical history and updated it with pertinent information if needed.   Past Medical History:   Diagnosis Date     Essential hypertension        MEDICATIONS:   Prior to Admission Medications   Prescriptions Last Dose Informant Patient Reported? Taking?   latanoprost (XALATAN) 0.005 % ophthalmic solution 9/13/2022 at pm  Yes Yes   Sig: Place 1 drop into both eyes At Bedtime   losartan-hydrochlorothiazide (HYZAAR)  50-12.5 MG tablet 9/14/2022 at am  Yes Yes   Sig: Take 1 tablet by mouth daily      Facility-Administered Medications Last Administration Doses Remaining   lidocaine (viscous) (XYLOCAINE) 2 % 15 mL, alum & mag hydroxide-simethicone (MAALOX) 15 mL GI Cocktail 9/14/2022  3:45 PM 0          ALLERGIES:   Allergies   Allergen Reactions     Aspirin Rash       SOCIAL HISTORY:  Social History     Tobacco Use     Smoking status: Never Smoker   Substance Use Topics     Alcohol use: Not Currently       FAMILY HISTORY:  Family History   Problem Relation Age of Onset     Pulmonary fibrosis Mother      Heart Disease Father        PHYSICAL EXAM:   General  Alert, oriented and comfortable  Vital Signs with Ranges  Temp: 98  F (36.7  C) Temp src: Oral BP: 133/82 Pulse: 51   Resp: 18 SpO2: (P) 97 % O2 Device: None (Room air)    I/O last 3 completed shifts:  In: 1050 [IV Piggyback:1050]  Out: -     Constitutional: healthy, alert and no distress   Cardiovascular: negative, PMI normal. No lifts, heaves, or thrills. RRR. No murmurs, clicks gallops or rub  Respiratory: negative, Percussion normal. Good diaphragmatic excursion. Lungs clear  Abdomen: Abdomen soft, non-tender. BS normal. No masses, organomegaly          ADDITIONAL COMMENTS:   I reviewed the patient's new clinical lab test results.   Recent Labs   Lab Test 09/15/22  0717 09/14/22  1544   WBC 6.2 7.9   HGB 13.2 14.4   MCV 88 89    261     Recent Labs   Lab Test 09/14/22  2148 09/14/22  1544   POTASSIUM 3.1* 3.2*   CHLORIDE 105 101   CO2 29 28   BUN 19 17   ANIONGAP 6 6     Recent Labs   Lab Test 09/14/22  2148 09/14/22  1544 09/14/22  1531   ALBUMIN 3.3* 3.9  --    BILITOTAL 5.6* 5.7*  --    * 675*  --    * 277*  --    PROTEIN  --   --  Negative   LIPASE 117  --   --        I reviewed the patient's new imaging results.      CONSULTATION ASSESSMENT AND PLAN:    Carla Jennings is a 57 year old female whom presented with abdominal pain and found to have  cholecystitis.    Acute cholecystitis  Choledocholithiasis  Elevated liver transaminases  Abdominal ultrasound consistent with cholecystitis and dilated CBD  LFTs elevated and trending down  Abdominal pain resolved  Patient likely has passed a gallstone as pain resolved    -- Await surgery consult and if planning cholecystectomy today will await IOC results. IF positive IOC will plan ERCP tomorrow  -- Alternatively could consider EUS today with possible ERCP with evidence of choledocholithiasis if unable to schedule for cholecystectomy today  -- Continue antibiotics, fluids, prn antiemetics and pain control  -- Keep NPO  -- Daily CMP and CBC        [facility].  Office: [phone]  Cell : [phone] (Dr. Mora)  Cell: [phone] (Raisa Agustin PA-C)

## 2022-09-15 NOTE — CONSULTS
General Surgery Consultation    Carla Jennings MRN#: [id number]   Age: 57 year old YOB: 1965     Date of Admission:  2022  Reason for consult: Acute Cholecystitis, Choledocholithiasis       Requesting physician: Anurag Douglass       Surgeon:        Hayden Alarcon        Chief Complaint:   Abdominal pain, epigastric     History is obtained from the patient and chart review.         History of Present Illness:   General Surgery was asked to evaluate this patient at the request of Dr. Douglass for Choledocholithiasis, Cholelithiasis and Acute Cholecystitis.    This patient is a 57 year old female with a significant past medical history of hypertension who presents with abdominal pain of 4-5 days duration.  It is located in the epigastric area and is worse 1-2 hours after eating.  It radiates to her back as well.  Aleve initially helped some with pain but now is not helping at all.  She also noticed that her urine was getting darker.  For these reasons, she presented to an urgent care where a GI cocktail was given which eased the pain significantly.  Unfortunately, her labs suggested liver transaminitis and she was sent to the ED.    She denies N/V, chest pain, SOB, dark or bloody stools.    In the ED, an abdominal US showed gallbladder full of gallstones, with uncertain wall thickness changes, and a mildly dilated common bile duct of 0.8cm.  GI and General Surgery were consulted.    This morning, on evaluation, her pain is well controlled.  Still no N/V.  She has no appetite however.  Denies fevers or chills.          Past Medical History:   Carla Jennings  has a past medical history of Essential hypertension.          Past Surgical History:     Past Surgical History:   Procedure Laterality Date      SECTION      x2     Hysterectomy          Social History:     Social History     Tobacco Use     Smoking status: Never Smoker     Smokeless tobacco: Not on file   Substance Use Topics      Alcohol use: Not Currently             Family History:   This patient has no significant family history          Allergies:     Allergies   Allergen Reactions     Aspirin Rash             Medications:     Prior to Admission medications    Medication Sig Start Date End Date Taking? Authorizing Provider   latanoprost (XALATAN) 0.005 % ophthalmic solution Place 1 drop into both eyes At Bedtime   Yes Unknown, Entered By History   losartan-hydrochlorothiazide (HYZAAR) 50-12.5 MG tablet Take 1 tablet by mouth daily 8/10/22  Yes Reported, Patient             Review of Systems:   The Review of Systems is negative other than noted in the HPI          Physical Exam:   Blood pressure 133/82, pulse 51, temperature 98  F (36.7  C), temperature source Oral, resp. rate 18, height 1.524 m (5'), weight 96.8 kg (213 lb 6.8 oz), SpO2 (P) 97 %.    General - This is a well developed, well nourished female in no apparent distress.  HEENT - Normocephalic. Atraumatic. Moist mucous membranes. Pupils equal.  No scleral icterus.  Neck - Supple without masses.  Trachea midline.  Lungs - Clear to ascultation bilaterally.  Non labored breathing.  Heart - Regular rate & rhythm without murmur.  Abdomen - Soft, nondistended with +bowel sounds.   Tender to epigastric area.  +Garrett   Extremities - Moves all extremities. Warm without edema.  Neurologic - Nonfocal. Very pleasant.  Appropriate responses.          Data:   Labs:  WBC -   WBC Count   Date Value Ref Range Status   09/15/2022 6.2 4.0 - 11.0 10e3/uL Final     Hgb -   Hemoglobin   Date Value Ref Range Status   09/15/2022 13.2 11.7 - 15.7 g/dL Final       Liver Function Studies -   Recent Labs   Lab Test 09/14/22  2148   PROTTOTAL 7.1   ALBUMIN 3.3*   BILITOTAL 5.6*   ALKPHOS 172*   *   *       CT scan of the abdomen: none      Ultrasound of the abdomen:  IMPRESSION:  1. Shadowing echogenic material in the region of the gallbladder likely represents a gallbladder filled with  stones. The gallbladder wall is not well seen, but may be mildly thickened as well. Acute cholecystitis could have this appearance. Please note,   if the patient has had a prior cholecystectomy, bowel within the gallbladder fossa could also have this appearance.  2. Hepatic steatosis.  3. The common duct is mildly dilated at 0.8 cm, with no cause identified.  4. Probable mildly prominent peripancreatic lymph node. CT could be performed for further characterization of this finding.  As read by Radiology             Assessment:   Choledocholithiasis  Cholelithiasis, with likely Acute Cholecystitis         Plan:     Discussed gallbladder function and pathology with patient.  GI consult just occurred too.  Feel likely has choledocholithiasis.  Would plan for lap cholecystectomy after any GI procedure.  Hopeful for OR tomorrow.  Discussed surgery details breifly and recovery expectations.  Patient would like to proceed when surgery team feels this is safe.   Dr. Alarcon will evaluate the patient independently.  Keep NPO in case GI procedure today  Ok for clears after, but NPO after midnight for possible lap brenda tomorrow.  Continue Zosyn  Will add Protonix IV  Dilaudid IV prn pain  Antiemetics available should she develop N/V    Total time spent reviewing chart/labs/imaging, evaluating and educating patient, documenting and coordinating plan of care: 58 min        Andrez Kennedy PA-C, physician assistant for Vadim Alarcon MD  [facility], [phone]  Pager [phone]

## 2022-09-15 NOTE — ED PROVIDER NOTES
History   Chief Complaint:  Abdominal Pain       The history is provided by the patient.      Carla Jennings is a 57 year old female with history of cholelithiasis and hypertension who presents with epigastric abdominal pain ongoing for the last 4 days. The pain has been constant and sharp since it started and will radiate to her back. She has been taking Aleve that seemed to help a little with the pain. She has been able to eat and notes that it does not make the pain worse until about 1 to 2 hours later. She has not had this type of pain before.  She also notes that she has had darker urine for the past 2 days. She was seen at  today and was sent here as she had abnormal lab results, with those results below. She was given a GI cocktail at  which helped with her pain greatly.  She denies any nausea, vomiting, fever, cough, sore throat, chest pain, or blood in her stools. No blood thinners.    Laboratory workup from  today:  CBC: WBC 7.9, HGB 14.4,    CMP: Potassium 3.2 (L), Glucose 101 (H), Alkaline phosphatase 203 (H),  (H),  (H), Bilirubin total 5.7 (H), GFR estimate 47 (L),  o/w WNL (Creatinine 1.32 (H))   UA with microscopic: Bacteria Few (A), Squamous Epithelials Moderate (A), Bilirubin Moderate (A), Blood Trace (A), o/w WNL     Review of Systems   Constitutional: Negative for fever.   HENT: Negative for sore throat.    Respiratory: Negative for cough.    Cardiovascular: Negative for chest pain.   Gastrointestinal: Positive for abdominal pain (epigastric). Negative for blood in stool, nausea and vomiting.   Genitourinary:        (+) dark urine   All other systems reviewed and are negative.      Allergies:  Aspirin    Medications:  Hyzaar    Past Medical History:     Complex endometrial hyperplasia with atypia  Postmenopausal bleeding  Cholelithiasis  NAFLD  Hypertension  Preglaucoma    Past Surgical History:     section x2  Hysterectomy  Tubal ligation   Colonoscopy  x2    Family History:    Breast cancer  Diabetes  Hypertension    Social History:  Patient came from home.  Patient is accompanied in the ED.  PCP: No Ref-Primary, Physician     Physical Exam     Patient Vitals for the past 24 hrs:   BP Temp Temp src Pulse Resp SpO2   09/14/22 2200 (!) 155/74 -- -- 57 20 99 %   09/14/22 1835 (!) 167/96 98  F (36.7  C) Oral 64 18 97 %       Physical Exam  Nursing note and vitals reviewed.  Constitutional:  Oriented to person, place, and time. Cooperative.   HENT:   Nose:    Nose normal.   Mouth/Throat:   Facemask in place.   Eyes:    Scleral icterus present.  Conjunctivae normal and EOM are normal.      Pupils are equal, round, and reactive to light.   Neck:    Trachea normal.   Cardiovascular:  Normal rate, regular rhythm, normal heart sounds and normal pulses. No murmur heard.  Pulmonary/Chest:  Effort normal and breath sounds normal.   Abdominal:   Soft. Normal appearance and bowel sounds are normal.      There is minimal tenderness to palpation in the upper abdomen.      There is no rebound and no CVA tenderness.   Musculoskeletal:  Extremities atraumatic x 4.   Lymphadenopathy:  No cervical adenopathy.   Neurological:   Alert and oriented to person, place, and time. Normal strength.      No cranial nerve deficit or sensory deficit. GCS eye subscore is 4. GCS verbal subscore is 5. GCS motor subscore is 6.   Skin:    Jaundiced.  Skin is intact. No rash noted.   Psychiatric:   Normal mood and affect.      Emergency Department Course     Imaging:  US Abdomen Limited (RUQ)   Final Result   IMPRESSION:   1. Shadowing echogenic material in the region of the gallbladder likely represents a gallbladder filled with stones. The gallbladder wall is not well seen, but may be mildly thickened as well. Acute cholecystitis could have this appearance. Please note,    if the patient has had a prior cholecystectomy, bowel within the gallbladder fossa could also have this appearance.   2. Hepatic  steatosis.   3. The common duct is mildly dilated at 0.8 cm, with no cause identified.   4. Probable mildly prominent peripancreatic lymph node. CT could be performed for further characterization of this finding.        Report per radiology    Laboratory:  Labs Ordered and Resulted from Time of ED Arrival to Time of ED Departure   COMPREHENSIVE METABOLIC PANEL - Abnormal       Result Value    Sodium 140      Potassium 3.1 (*)     Chloride 105      Carbon Dioxide (CO2) 29      Anion Gap 6      Urea Nitrogen 19      Creatinine 1.35 (*)     Calcium 8.7      Glucose 114 (*)     Alkaline Phosphatase 172 (*)      (*)      (*)     Protein Total 7.1      Albumin 3.3 (*)     Bilirubin Total 5.6 (*)     GFR Estimate 46 (*)    LIPASE - Normal    Lipase 117     COVID-19 VIRUS (CORONAVIRUS) BY PCR - Normal    SARS CoV2 PCR Negative          Emergency Department Course:    Reviewed:  I reviewed nursing notes, vitals, past medical history and Care Everywhere    Assessments:  2006 I obtained history and examined the patient as noted above.    I rechecked the patient and explained findings.     Consults:  2150 I consulted with Dr. Douglass of the hospitalist service and discussed patient admission. They accepted care of the patient.    Interventions:  2113 NS 1000 mL IV  2202 Zosyn 3.375 g IV    Disposition:  The patient was admitted to the hospital under the care of Dr. Douglass.     Impression & Plan     CMS Diagnoses: None    Medical Decision Making:  This is a 57-year-old female who was sent here from urgent care due to elevated liver enzymes and abdominal pain.  Upon arrival here, her pain had improved significantly, but she was still jaundiced here and had some minimal discomfort on exam.  I was concerned that she could have acute cholecystitis as well as choledocholithiasis.  I proceeded with the ultrasound here and she had repeat blood work obtained here as well as a lipase.  Her ultrasound is concerning for  cholecystitis as well and she clearly has gallstones and likely choledocholithiasis.  We are providing her IV Zosyn.  She clearly needs to be admitted to the hospital for further evaluation and management.  I subsequently spoke with Dr. Douglass, who will be admitting her.    Diagnosis:    ICD-10-CM    1. Choledocholithiasis  K80.50    2. Acute cholecystitis  K81.0        Scribe Disclosure:  I, Nghia Cruz, am serving as a scribe at 8:05 PM on 9/14/2022 to document services personally performed by Marcellus Jaime MD based on my observations and the provider's statements to me.        Marcellus Jaime MD  09/14/22 3319

## 2022-09-15 NOTE — H&P
[facility]    History and Physical  Hospitalist     Date of Admission:  9/14/2022    Assessment & Plan   Carla Jennings is a 57 year old female with a past medical history of hypertension admitted to hospital for acute cholecystitis and choledocholithiasis.    Acute cholecystitis  Choledocholithiasis  Patient presenting with worsening abdominal pain present for approximately 5 days located in the epigastrium and the right upper quadrant.  In urgent care was found to have transaminitis as well as an elevated bilirubin.  Patient was sent to the emergency room where she underwent an ultrasound which revealed gallstones as well as possible wall thickening.  The common bile duct was mildly dilated at 0.8 cm  -N.p.o.  -Zosyn  -IV hydration  -Follow-up surgery consult and GI consult    Hypokalemia  -Replace per protocol    Hypertension  Hold losartan perioperatively      Code Status   Full Code  DVT ppx: SCDs  Expected length of stay greater than 2 days    Clinically Significant Risk Factors Present on Admission                 # Hypertension: home medication list includes antihypertensive(s)          Primary Care Physician   Physician No Ref-Primary    Chief Complaint   Abdominal pain    History obtained from the patient    History of Present Illness   Carla Jennings is a 57 year old female with a past medical history of hypertension presents to hospital with abdominal pain.  Patient reports that her abdominal pain started on Saturday as an epigastric discomfort which progressively got worse.  She initially treated her symptoms with Aleve which only temporarily resolved symptoms however with time it was less effective.  Due to progressively worsening abdominal pain she decided to come into the urgent care center.  At the urgent care center her pain had gotten up to a 7 out of 10.  The pain is intermittent.  She received a GI cocktail and urgent care which improved her symptoms.  The pain is  radiating from the epigastrium across the right upper quadrant to the back.  She did noticed that her urine was getting darker over the last few days.  The patient provides no other complaints and denies any fevers, chills, nausea, vomiting, diarrhea.  She denies ever having similar symptoms in the past.    Past Medical History    I have reviewed this patient's medical history and updated it with pertinent information if needed.   Hypertension    Past Surgical History   I have reviewed this patient's surgical history and updated it with pertinent information if needed.   x2    Prior to Admission Medications   Prior to Admission Medications   Prescriptions Last Dose Informant Patient Reported? Taking?   latanoprost (XALATAN) 0.005 % ophthalmic solution 2022 at pm  Yes Yes   Sig: Place 1 drop into both eyes At Bedtime   losartan-hydrochlorothiazide (HYZAAR) 50-12.5 MG tablet 2022 at am  Yes Yes   Sig: Take 1 tablet by mouth daily      Facility-Administered Medications Last Administration Doses Remaining   lidocaine (viscous) (XYLOCAINE) 2 % 15 mL, alum & mag hydroxide-simethicone (MAALOX) 15 mL GI Cocktail 2022  3:45 PM 0        Allergies   Allergies   Allergen Reactions     Aspirin Rash       Social History   I have reviewed this patient's social history and updated it with pertinent information if needed. Carla Jennings  reports that she has never smoked. She does not have any smokeless tobacco history on file. She reports previous alcohol use.    Family History   I have reviewed this patient's family history and updated it with pertinent information if needed.   Pulmonary fibrosis in her mother  Heart disease in her father neck    Review of Systems   The 10 point Review of Systems is negative other than noted in the HPI or here.     Physical Exam   Temp: 98  F (36.7  C) Temp src: Oral BP: (!) 155/74 Pulse: 57   Resp: 20 SpO2: 99 % O2 Device: None (Room air)    Vital Signs with  Ranges  Temp:  [98  F (36.7  C)] 98  F (36.7  C)  Pulse:  [57-64] 57  Resp:  [18-20] 20  BP: (138-167)/(74-96) 155/74  SpO2:  [97 %-99 %] 99 %  0 lbs 0 oz  Physical Exam  Vitals reviewed.   Constitutional:       Appearance: Normal appearance. She is obese.      Comments: Pleasant lady seen resting in bed comfortably no apparent distress accompanied by her  is bedside.  Well-developed and well-nourished.   HENT:      Head: Normocephalic and atraumatic.      Mouth/Throat:      Mouth: Mucous membranes are moist.      Pharynx: Oropharynx is clear.   Eyes:      Extraocular Movements: Extraocular movements intact.      Conjunctiva/sclera: Conjunctivae normal.      Pupils: Pupils are equal, round, and reactive to light.   Cardiovascular:      Rate and Rhythm: Normal rate and regular rhythm.      Pulses: Normal pulses.      Heart sounds: Normal heart sounds. No murmur heard.  Pulmonary:      Effort: Pulmonary effort is normal.      Breath sounds: Normal breath sounds. No wheezing, rhonchi or rales.   Abdominal:      General: Abdomen is flat. Bowel sounds are normal.      Palpations: Abdomen is soft.      Tenderness: There is abdominal tenderness.      Comments: Right upper quadrant tenderness without rebound or guarding.   Musculoskeletal:         General: No swelling. Normal range of motion.      Cervical back: Normal range of motion and neck supple.   Skin:     General: Skin is warm and dry.   Neurological:      General: No focal deficit present.      Mental Status: She is alert and oriented to person, place, and time. Mental status is at baseline.      Cranial Nerves: No cranial nerve deficit.

## 2022-09-15 NOTE — PROGRESS NOTES
Orientation/Cognitive: AAOX4  Observation Goals (Met/ Not Met): Not Met  Mobility Level/Assist Equipment: independent  Fall Risk (Y/N): N  Behavior Concerns: None  Pain Management: Yes  Tele/VS/O2: VSS  ABNL Lab/BG: yes  Diet: NPO except for ice chips and meds  Bowel/Bladder: Abdominal pain  Skin Concerns: No  Drains/Devices: No  Tests/Procedures for next shift:Yes  Anticipated DC date & active delays: No  Patient Stated Goal for Today: Yes

## 2022-09-15 NOTE — PROGRESS NOTES
RECEIVING UNIT ED HANDOFF REVIEW    ED Nurse Handoff Report was reviewed by: Estee Oneal RN on September 15, 2022 at 12:32 AM

## 2022-09-16 ENCOUNTER — ANESTHESIA EVENT (OUTPATIENT)
Dept: SURGERY | Facility: CLINIC | Age: 57
DRG: 418 | End: 2022-09-16
Payer: COMMERCIAL

## 2022-09-16 ENCOUNTER — ANESTHESIA (OUTPATIENT)
Dept: SURGERY | Facility: CLINIC | Age: 57
DRG: 418 | End: 2022-09-16
Payer: COMMERCIAL

## 2022-09-16 LAB
ALBUMIN SERPL-MCNC: 3 G/DL (ref 3.4–5)
ALP SERPL-CCNC: 154 U/L (ref 40–150)
ALT SERPL W P-5'-P-CCNC: 477 U/L (ref 0–50)
ANION GAP SERPL CALCULATED.3IONS-SCNC: 8 MMOL/L (ref 3–14)
AST SERPL W P-5'-P-CCNC: 208 U/L (ref 0–45)
BILIRUB SERPL-MCNC: 4.7 MG/DL (ref 0.2–1.3)
BUN SERPL-MCNC: 18 MG/DL (ref 7–30)
CALCIUM SERPL-MCNC: 8.7 MG/DL (ref 8.5–10.1)
CHLORIDE BLD-SCNC: 112 MMOL/L (ref 94–109)
CO2 SERPL-SCNC: 20 MMOL/L (ref 20–32)
CREAT SERPL-MCNC: 0.96 MG/DL (ref 0.52–1.04)
GFR SERPL CREATININE-BSD FRML MDRD: 69 ML/MIN/1.73M2
GLUCOSE BLD-MCNC: 121 MG/DL (ref 70–99)
POTASSIUM BLD-SCNC: 4.4 MMOL/L (ref 3.4–5.3)
PROT SERPL-MCNC: 6.9 G/DL (ref 6.8–8.8)
SODIUM SERPL-SCNC: 140 MMOL/L (ref 133–144)

## 2022-09-16 PROCEDURE — 370N000017 HC ANESTHESIA TECHNICAL FEE, PER MIN: Performed by: SURGERY

## 2022-09-16 PROCEDURE — 0FT44ZZ RESECTION OF GALLBLADDER, PERCUTANEOUS ENDOSCOPIC APPROACH: ICD-10-PCS | Performed by: SURGERY

## 2022-09-16 PROCEDURE — 360N000076 HC SURGERY LEVEL 3, PER MIN: Performed by: SURGERY

## 2022-09-16 PROCEDURE — 250N000011 HC RX IP 250 OP 636: Performed by: REGISTERED NURSE

## 2022-09-16 PROCEDURE — 250N000025 HC SEVOFLURANE, PER MIN: Performed by: SURGERY

## 2022-09-16 PROCEDURE — 250N000011 HC RX IP 250 OP 636: Performed by: HOSPITALIST

## 2022-09-16 PROCEDURE — 88304 TISSUE EXAM BY PATHOLOGIST: CPT | Mod: 26 | Performed by: PATHOLOGY

## 2022-09-16 PROCEDURE — 250N000011 HC RX IP 250 OP 636: Performed by: PHYSICIAN ASSISTANT

## 2022-09-16 PROCEDURE — 47562 LAPAROSCOPIC CHOLECYSTECTOMY: CPT | Performed by: SURGERY

## 2022-09-16 PROCEDURE — 258N000003 HC RX IP 258 OP 636: Performed by: REGISTERED NURSE

## 2022-09-16 PROCEDURE — 36415 COLL VENOUS BLD VENIPUNCTURE: CPT | Performed by: INTERNAL MEDICINE

## 2022-09-16 PROCEDURE — 258N000003 HC RX IP 258 OP 636: Performed by: HOSPITALIST

## 2022-09-16 PROCEDURE — 120N000001 HC R&B MED SURG/OB

## 2022-09-16 PROCEDURE — 999N000141 HC STATISTIC PRE-PROCEDURE NURSING ASSESSMENT: Performed by: SURGERY

## 2022-09-16 PROCEDURE — 250N000011 HC RX IP 250 OP 636: Performed by: ANESTHESIOLOGY

## 2022-09-16 PROCEDURE — 250N000009 HC RX 250: Performed by: SURGERY

## 2022-09-16 PROCEDURE — 272N000001 HC OR GENERAL SUPPLY STERILE: Performed by: SURGERY

## 2022-09-16 PROCEDURE — 710N000009 HC RECOVERY PHASE 1, LEVEL 1, PER MIN: Performed by: SURGERY

## 2022-09-16 PROCEDURE — 258N000003 HC RX IP 258 OP 636: Performed by: PHYSICIAN ASSISTANT

## 2022-09-16 PROCEDURE — 250N000009 HC RX 250: Performed by: REGISTERED NURSE

## 2022-09-16 PROCEDURE — 250N000013 HC RX MED GY IP 250 OP 250 PS 637: Performed by: PHYSICIAN ASSISTANT

## 2022-09-16 PROCEDURE — 88304 TISSUE EXAM BY PATHOLOGIST: CPT | Mod: TC | Performed by: SURGERY

## 2022-09-16 PROCEDURE — 258N000003 HC RX IP 258 OP 636: Performed by: ANESTHESIOLOGY

## 2022-09-16 PROCEDURE — 80053 COMPREHEN METABOLIC PANEL: CPT | Performed by: INTERNAL MEDICINE

## 2022-09-16 PROCEDURE — 99232 SBSQ HOSP IP/OBS MODERATE 35: CPT | Performed by: INTERNAL MEDICINE

## 2022-09-16 PROCEDURE — C9113 INJ PANTOPRAZOLE SODIUM, VIA: HCPCS | Performed by: PHYSICIAN ASSISTANT

## 2022-09-16 PROCEDURE — 82040 ASSAY OF SERUM ALBUMIN: CPT | Performed by: INTERNAL MEDICINE

## 2022-09-16 RX ORDER — PROPOFOL 10 MG/ML
INJECTION, EMULSION INTRAVENOUS PRN
Status: DISCONTINUED | OUTPATIENT
Start: 2022-09-16 | End: 2022-09-16

## 2022-09-16 RX ORDER — ONDANSETRON 2 MG/ML
4 INJECTION INTRAMUSCULAR; INTRAVENOUS EVERY 30 MIN PRN
Status: DISCONTINUED | OUTPATIENT
Start: 2022-09-16 | End: 2022-09-16

## 2022-09-16 RX ORDER — ONDANSETRON 2 MG/ML
INJECTION INTRAMUSCULAR; INTRAVENOUS PRN
Status: DISCONTINUED | OUTPATIENT
Start: 2022-09-16 | End: 2022-09-16

## 2022-09-16 RX ORDER — SODIUM CHLORIDE, SODIUM LACTATE, POTASSIUM CHLORIDE, CALCIUM CHLORIDE 600; 310; 30; 20 MG/100ML; MG/100ML; MG/100ML; MG/100ML
INJECTION, SOLUTION INTRAVENOUS CONTINUOUS
Status: DISCONTINUED | OUTPATIENT
Start: 2022-09-16 | End: 2022-09-16

## 2022-09-16 RX ORDER — HYDROMORPHONE HCL IN WATER/PF 6 MG/30 ML
0.2 PATIENT CONTROLLED ANALGESIA SYRINGE INTRAVENOUS EVERY 5 MIN PRN
Status: DISCONTINUED | OUTPATIENT
Start: 2022-09-16 | End: 2022-09-16

## 2022-09-16 RX ORDER — BUPIVACAINE HYDROCHLORIDE AND EPINEPHRINE 2.5; 5 MG/ML; UG/ML
INJECTION, SOLUTION INFILTRATION; PERINEURAL PRN
Status: DISCONTINUED | OUTPATIENT
Start: 2022-09-16 | End: 2022-09-16 | Stop reason: HOSPADM

## 2022-09-16 RX ORDER — ONDANSETRON 4 MG/1
4 TABLET, ORALLY DISINTEGRATING ORAL EVERY 30 MIN PRN
Status: DISCONTINUED | OUTPATIENT
Start: 2022-09-16 | End: 2022-09-16

## 2022-09-16 RX ORDER — SODIUM CHLORIDE, SODIUM LACTATE, POTASSIUM CHLORIDE, CALCIUM CHLORIDE 600; 310; 30; 20 MG/100ML; MG/100ML; MG/100ML; MG/100ML
INJECTION, SOLUTION INTRAVENOUS CONTINUOUS PRN
Status: DISCONTINUED | OUTPATIENT
Start: 2022-09-16 | End: 2022-09-16

## 2022-09-16 RX ORDER — OXYCODONE HYDROCHLORIDE 5 MG/1
5 TABLET ORAL EVERY 4 HOURS PRN
Status: DISCONTINUED | OUTPATIENT
Start: 2022-09-16 | End: 2022-09-16

## 2022-09-16 RX ORDER — FENTANYL CITRATE 0.05 MG/ML
INJECTION, SOLUTION INTRAMUSCULAR; INTRAVENOUS PRN
Status: DISCONTINUED | OUTPATIENT
Start: 2022-09-16 | End: 2022-09-16

## 2022-09-16 RX ORDER — KETOROLAC TROMETHAMINE 15 MG/ML
15 INJECTION, SOLUTION INTRAMUSCULAR; INTRAVENOUS ONCE
Status: COMPLETED | OUTPATIENT
Start: 2022-09-16 | End: 2022-09-16

## 2022-09-16 RX ORDER — LIDOCAINE HYDROCHLORIDE 20 MG/ML
INJECTION, SOLUTION INFILTRATION; PERINEURAL PRN
Status: DISCONTINUED | OUTPATIENT
Start: 2022-09-16 | End: 2022-09-16

## 2022-09-16 RX ORDER — DEXAMETHASONE SODIUM PHOSPHATE 4 MG/ML
INJECTION, SOLUTION INTRA-ARTICULAR; INTRALESIONAL; INTRAMUSCULAR; INTRAVENOUS; SOFT TISSUE PRN
Status: DISCONTINUED | OUTPATIENT
Start: 2022-09-16 | End: 2022-09-16

## 2022-09-16 RX ORDER — FENTANYL CITRATE 50 UG/ML
25 INJECTION, SOLUTION INTRAMUSCULAR; INTRAVENOUS EVERY 5 MIN PRN
Status: DISCONTINUED | OUTPATIENT
Start: 2022-09-16 | End: 2022-09-16

## 2022-09-16 RX ADMIN — PIPERACILLIN AND TAZOBACTAM 3.38 G: 3; .375 INJECTION, POWDER, FOR SOLUTION INTRAVENOUS at 22:12

## 2022-09-16 RX ADMIN — PIPERACILLIN AND TAZOBACTAM 3.38 G: 3; .375 INJECTION, POWDER, FOR SOLUTION INTRAVENOUS at 08:01

## 2022-09-16 RX ADMIN — FENTANYL CITRATE 100 MCG: 50 INJECTION INTRAVENOUS at 12:37

## 2022-09-16 RX ADMIN — FENTANYL CITRATE 50 MCG: 50 INJECTION INTRAVENOUS at 13:02

## 2022-09-16 RX ADMIN — KETOROLAC TROMETHAMINE 15 MG: 15 INJECTION, SOLUTION INTRAMUSCULAR; INTRAVENOUS at 14:27

## 2022-09-16 RX ADMIN — PROPOFOL 20 MG: 10 INJECTION, EMULSION INTRAVENOUS at 13:17

## 2022-09-16 RX ADMIN — DEXAMETHASONE SODIUM PHOSPHATE 4 MG: 4 INJECTION, SOLUTION INTRA-ARTICULAR; INTRALESIONAL; INTRAMUSCULAR; INTRAVENOUS; SOFT TISSUE at 12:51

## 2022-09-16 RX ADMIN — ONDANSETRON 4 MG: 2 INJECTION INTRAMUSCULAR; INTRAVENOUS at 12:55

## 2022-09-16 RX ADMIN — PIPERACILLIN AND TAZOBACTAM 3.38 G: 3; .375 INJECTION, POWDER, FOR SOLUTION INTRAVENOUS at 02:01

## 2022-09-16 RX ADMIN — SUGAMMADEX 200 MG: 100 INJECTION, SOLUTION INTRAVENOUS at 13:25

## 2022-09-16 RX ADMIN — FENTANYL CITRATE 25 MCG: 50 INJECTION, SOLUTION INTRAMUSCULAR; INTRAVENOUS at 14:17

## 2022-09-16 RX ADMIN — HYDROMORPHONE HYDROCHLORIDE 0.2 MG: 0.2 INJECTION, SOLUTION INTRAMUSCULAR; INTRAVENOUS; SUBCUTANEOUS at 17:01

## 2022-09-16 RX ADMIN — SODIUM CHLORIDE: 9 INJECTION, SOLUTION INTRAVENOUS at 17:01

## 2022-09-16 RX ADMIN — SODIUM CHLORIDE: 9 INJECTION, SOLUTION INTRAVENOUS at 06:34

## 2022-09-16 RX ADMIN — PROPOFOL 30 MG: 10 INJECTION, EMULSION INTRAVENOUS at 13:04

## 2022-09-16 RX ADMIN — MIDAZOLAM 2 MG: 1 INJECTION INTRAMUSCULAR; INTRAVENOUS at 12:31

## 2022-09-16 RX ADMIN — SODIUM CHLORIDE, POTASSIUM CHLORIDE, SODIUM LACTATE AND CALCIUM CHLORIDE: 600; 310; 30; 20 INJECTION, SOLUTION INTRAVENOUS at 15:53

## 2022-09-16 RX ADMIN — LIDOCAINE HYDROCHLORIDE 60 MG: 20 INJECTION, SOLUTION INFILTRATION; PERINEURAL at 12:37

## 2022-09-16 RX ADMIN — SODIUM CHLORIDE, POTASSIUM CHLORIDE, SODIUM LACTATE AND CALCIUM CHLORIDE: 600; 310; 30; 20 INJECTION, SOLUTION INTRAVENOUS at 12:31

## 2022-09-16 RX ADMIN — LATANOPROST 1 DROP: 50 SOLUTION/ DROPS OPHTHALMIC at 22:12

## 2022-09-16 RX ADMIN — PROPOFOL 200 MG: 10 INJECTION, EMULSION INTRAVENOUS at 12:37

## 2022-09-16 RX ADMIN — PANTOPRAZOLE SODIUM 40 MG: 40 INJECTION, POWDER, FOR SOLUTION INTRAVENOUS at 06:34

## 2022-09-16 RX ADMIN — ROCURONIUM BROMIDE 50 MG: 50 INJECTION, SOLUTION INTRAVENOUS at 12:39

## 2022-09-16 ASSESSMENT — ACTIVITIES OF DAILY LIVING (ADL)
ADLS_ACUITY_SCORE: 18
ADLS_ACUITY_SCORE: 18
ADLS_ACUITY_SCORE: 19
ADLS_ACUITY_SCORE: 18
ADLS_ACUITY_SCORE: 21
ADLS_ACUITY_SCORE: 18
ADLS_ACUITY_SCORE: 21

## 2022-09-16 ASSESSMENT — LIFESTYLE VARIABLES: TOBACCO_USE: 0

## 2022-09-16 ASSESSMENT — ENCOUNTER SYMPTOMS: SEIZURES: 0

## 2022-09-16 NOTE — ANESTHESIA POSTPROCEDURE EVALUATION
Patient: Carla Jennings    Procedure: Procedure(s):  ENDOSCOPIC ULTRASOUND, ESOPHAGOSCOPY / UPPER GASTROINTESTINAL TRACT (GI), SPHINCTEROTOMY, STONE REMOVAL, STENT PLACEMENT  ENDOSCOPIC RETROGRADE CHOLANGIOPANCREATOGRAPHY, COMPLEX       Anesthesia Type:  MAC    Note:  Disposition: Inpatient   Postop Pain Control: Uneventful            Sign Out: Well controlled pain   PONV: No   Neuro/Psych: Uneventful            Sign Out: Acceptable/Baseline neuro status   Airway/Respiratory: Uneventful            Sign Out: Acceptable/Baseline resp. status   CV/Hemodynamics: Uneventful            Sign Out: Acceptable CV status; No obvious hypovolemia; No obvious fluid overload   Other NRE: NONE   DID A NON-ROUTINE EVENT OCCUR? No           Last vitals:  Vitals Value Taken Time   /89 09/15/22 2200   Temp     Pulse 53 09/15/22 2206   Resp 19 09/15/22 2206   SpO2 94 % 09/15/22 2206   Vitals shown include unvalidated device data.    Electronically Signed By: Torsten Jennings DO  September 15, 2022  10:08 PM

## 2022-09-16 NOTE — ANESTHESIA PROCEDURE NOTES
Airway         Procedure Start/Stop Times: 9/16/2022 12:44 PM  Staff -        Anesthesiologist:  Alin Huerta MD       CRNA: Morris Fine APRN CRNA       Performed By: CRNA  Consent for Airway        Urgency: elective  Indications and Patient Condition       Indications for airway management: rogelio-procedural       Induction type:intravenous       Mask difficulty assessment: 0 - not attempted    Final Airway Details       Final airway type: endotracheal airway       Successful airway: ETT - single  Endotracheal Airway Details        ETT size (mm): 7.5       Cuffed: yes       Cuff volume (mL): 9       Successful intubation technique: direct laryngoscopy       DL Blade Type: Alford 2       Grade View of Cords: 1       Adjucts: stylet       Position: Right       Measured from: lips       Secured at (cm): 21       Bite block used: None    Post intubation assessment        Placement verified by: capnometry, equal breath sounds and chest rise        Number of attempts at approach: 1       Number of other approaches attempted: 0       Secured with: pink tape       Ease of procedure: easy       Dentition: Intact and Unchanged    Medication(s) Administered   Medication Administration Time: 9/16/2022 12:44 PM

## 2022-09-16 NOTE — DISCHARGE INSTRUCTIONS
[facility] - [facility]  Discharge Instructions: Post-Operative Laparoscopic Cholecystectomy    ACTIVITY  Expect to feel tired after your surgery.  This will gradually resolve.    Take frequent, short walks and increase your activity gradually.    Avoid strenuous physical activity or heavy lifting greater than 15-20 lbs. for 2-3 weeks.  You may climb stairs.  You may drive without restrictions when you are not using any prescription pain medication and feel comfortable in a car.  You may return to work/school when you are comfortable without any prescription pain medication.    WOUND CARE  You may remove your outer dressing or Band-Aids and shower 48 hours after the surgery.  Pat your incisions dry and leave them open to air.  Re-apply dressing (Band-Aids or gauze/tape) as needed for comfort or drainage.  You may have steri-strips (looks like white tape) on your incision.  You may peel off the steri-strips 2 weeks after your surgery if they have not peeled off on their own.   Do not soak your incisions in a tub or pool for 2 weeks.   Do not apply any lotions, creams, or ointments to your incisions.  A ridge under your incisions is normal and will gradually resolve.    DIET  Start with liquids, then gradually resume your regular diet as tolerated.  Avoid heavy, spicy, and greasy meals for 2-3 days.  Drink plenty of fluids to stay hydrated.  It is not uncommon to experience some loose stools or diarrhea after surgery.  This is your body's way of adapting to the bile which will slowly drain into your intestine.  A low fat diet may help with this.  This should improve over 1-2 months.    PAIN  Expect some tenderness and discomfort at the incision sites.  Use the prescribed pain medication at your discretion.  Expect gradual resolution of your pain over several days.  You may take ibuprofen with food (unless you have been told not to) or acetaminophen/Tylenol instead of or in addition to your prescribed  pain medication.  However, if you are taking Norco or Percocet, do not take any additional acetaminophen/Tylenol.  Do not drink alcohol or drive while you are taking pain medications.  You may apply ice to your incisions in 20 minute intervals as needed for the next 48 hours.  After that time, consider switching to heat if you prefer.    EXPECTATIONS  Pain medications can cause constipation.  Limit use when possible.  Take an over the counter or prescribed stool softener/stimulant, such as Colace or Senna, 1-2 times a day with plenty of water.  You may take a mild over the counter laxative, such as Miralax or a suppository, as needed.  You may discontinue these medications once you are having regular bowel movements and/or are no longer taking your narcotic pain medication.    You may have shoulder or upper back discomfort due to the gas used in surgery.  This is temporary and should resolve in 48-72 hours.  Short, frequent walks may help with this.  If you are unable to urinate for 8 hours or feel as though you are not emptying your bladder adequately, we recommend you seek care at an ER or Urgent Care facility for possible catheter placement.     FOLLOW UP  Our office will contact you in approximately 2-3 weeks to check on your progress and answer any questions you may have.  If you are doing well, you will not need to return for a follow up appointment.  If any concerns are identified over the phone, we will help you make an appointment to see a provider.   If you have not received a phone call, have any questions or concerns, or would like to be seen, please call us at [phone] and ask to speak with our nurse.  We are located at [address] [place] [address].    CALL OUR OFFICE -[phone] IF YOU HAVE:   Chills or fever above 101 F.  Increased redness, warmth, or drainage at your incisions.  Significant bleeding.  Pain not relieved by your pain medication or rest.  Increasing pain  after the first 48 hours.  Any other concerns or questions.                      Revised December 2021

## 2022-09-16 NOTE — ANESTHESIA PREPROCEDURE EVALUATION
Anesthesia Pre-Procedure Evaluation    Patient: Carla Jennings   MRN: [id number] : 1965        Procedure : Procedure(s):  ENDOSCOPIC ULTRASOUND, ESOPHAGOSCOPY / UPPER GASTROINTESTINAL TRACT (GI)  ENDOSCOPIC RETROGRADE CHOLANGIOPANCREATOGRAPHY, COMPLEX          Past Medical History:   Diagnosis Date     Essential hypertension       Past Surgical History:   Procedure Laterality Date      SECTION      x2      Allergies   Allergen Reactions     Aspirin Rash      Social History     Tobacco Use     Smoking status: Never Smoker     Smokeless tobacco: Not on file   Substance Use Topics     Alcohol use: Not Currently      Wt Readings from Last 1 Encounters:   09/15/22 96.8 kg (213 lb 6.8 oz)        Anesthesia Evaluation            ROS/MED HX  ENT/Pulmonary:       Neurologic:       Cardiovascular:     (+) hypertension-----    METS/Exercise Tolerance:     Hematologic:       Musculoskeletal:       GI/Hepatic:     (+) cholecystitis/cholelithiasis,     Renal/Genitourinary:       Endo:       Psychiatric/Substance Use:       Infectious Disease:       Malignancy:       Other:               OUTSIDE LABS:  CBC:   Lab Results   Component Value Date    WBC 6.2 09/15/2022    WBC 7.9 2022    HGB 13.2 09/15/2022    HGB 14.4 2022    HCT 39.8 09/15/2022    HCT 43.5 2022     09/15/2022     2022     BMP:   Lab Results   Component Value Date     09/15/2022     2022    POTASSIUM 3.8 09/15/2022    POTASSIUM 3.7 09/15/2022    CHLORIDE 110 (H) 09/15/2022    CHLORIDE 105 2022    CO2 24 09/15/2022    CO2 29 2022    BUN 18 09/15/2022    BUN 19 2022    CR 1.35 (H) 09/15/2022    CR 1.35 (H) 2022    GLC 97 09/15/2022     (H) 2022     COAGS: No results found for: PTT, INR, FIBR  POC: No results found for: BGM, HCG, HCGS  HEPATIC:   Lab Results   Component Value Date    ALBUMIN 3.2 (L) 09/15/2022    PROTTOTAL 7.0 09/15/2022     (HH)  09/15/2022     (H) 09/15/2022    ALKPHOS 172 (H) 09/15/2022    BILITOTAL 5.9 (H) 09/15/2022     OTHER:   Lab Results   Component Value Date    REJI 8.7 09/15/2022    LIPASE 117 09/14/2022       Anesthesia Plan    ASA Status:  2   NPO Status:  NPO Appropriate    Anesthesia Type: MAC.     - Reason for MAC: immobility needed, straight local not clinically adequate              Consents    Anesthesia Plan(s) and associated risks, benefits, and realistic alternatives discussed. Questions answered and patient/representative(s) expressed understanding.    - Discussed:     - Discussed with:  Patient      - Extended Intubation/Ventilatory Support Discussed: No.      - Patient is DNR/DNI Status: No    Use of blood products discussed: Yes.     - Discussed with: Patient.     - Consented: consented to blood products            Reason for refusal: other.     Postoperative Care    Pain management: IV analgesics, Oral pain medications, Multi-modal analgesia.   PONV prophylaxis: Ondansetron (or other 5HT-3), Dexamethasone or Solumedrol, Background Propofol Infusion     Comments:                Torsten Jennings DO

## 2022-09-16 NOTE — BRIEF OP NOTE
[facility]    Brief Operative Note    Pre-operative diagnosis: Acute cholecystitis [K81.0]  Post-operative diagnosis Acute Cholecystitis    Procedure: Procedure(s):  LAPAROSCOPIC CHOLECYSTECTOMY,     Surgeon: Surgeon(s) and Role:     * Hayden Alarcon MD - Primary     * Andrez Kennedy PA-C - Assisting     Anesthesia: General   Estimated Blood Loss: 15 mL from 9/16/2022 12:31 PM to 9/16/2022  1:43 PM      Drains: None  Specimens:   ID Type Source Tests Collected by Time Destination   1 : GALLBLADDER AND CONTENTS Tissue Gallbladder SURGICAL PATHOLOGY EXAM Hayden Alarcon MD 9/16/2022  1:15 PM      Findings:   None.  Complications: None.  Implants: * No implants in log *

## 2022-09-16 NOTE — ANESTHESIA CARE TRANSFER NOTE
Patient: Carla Jennings    Procedure: Procedure(s):  LAPAROSCOPIC CHOLECYSTECTOMY,       Diagnosis: Acute cholecystitis [K81.0]  Diagnosis Additional Information: No value filed.    Anesthesia Type:   General     Note:    Oropharynx: oropharynx clear of all foreign objects and spontaneously breathing  Level of Consciousness: drowsy  Oxygen Supplementation: face mask  Level of Supplemental Oxygen (L/min / FiO2): 6  Independent Airway: airway patency satisfactory and stable  Dentition: dentition unchanged  Vital Signs Stable: post-procedure vital signs reviewed and stable  Report to RN Given: handoff report given  Patient transferred to: PACU    Handoff Report: Identifed the Patient, Identified the Reponsible Provider, Reviewed the pertinent medical history, Discussed the surgical course, Reviewed Intra-OP anesthesia mangement and issues during anesthesia, Set expectations for post-procedure period and Allowed opportunity for questions and acknowledgement of understanding      Vitals:  Vitals Value Taken Time   /86 09/16/22 1345   Temp 98    Pulse 59 09/16/22 1349   Resp 18 09/16/22 1349   SpO2 99 % 09/16/22 1349   Vitals shown include unvalidated device data.    Electronically Signed By: ARTIE Allred CRNA  September 16, 2022  1:50 PM

## 2022-09-16 NOTE — PROGRESS NOTES
[facility]    Hospitalist Progress Note    Date of Service (when I saw the patient): 09/16/2022    Assessment & Plan   Carla Jennings is a 57 year old female who was admitted on 9/14/2022.  Carla Jennings is a 57 year old female with a past medical history of hypertension admitted to hospital for acute cholecystitis and choledocholithiasis.     Acute cholecystitis  Choledocholithiasis  Patient presenting with worsening abdominal pain present for approximately 5 days located in the epigastrium and the right upper quadrant.  In urgent care was found to have transaminitis as well as an elevated bilirubin.  Patient was sent to the emergency room where she underwent an ultrasound which revealed gallstones as well as possible wall thickening.  The common bile duct was mildly dilated at 0.8 cm  -N.p.o.  -Zosyn  -IV hydration  GI and general surgery consulted. Appreciate assistance    EUS and ERCP done by GI   EUS showed Endoscopic ultrasound findings consistent with dilated common bile duct with multiple stones in the distal common bile duct.  ERCP consistent with the above-mentioned findings multiple stones were removed from common bile duct large amount of sludge was also removed from the common bile duct sphincterotomy was done a 5 cm long 10 and plastic stent was placed in the common bile duct with good bile flow.  Cholecystectomy today per surgery     NPO after midnight for lap brenda today     Hypokalemia  -Replace per protocol     Hypertension  Hold losartan perioperatively              Code Status []Expand by Default     Full Code  DVT ppx: SCDs  Expected length of stay greater than 2 days           Clinically Significant Risk Factors Present on Admission                    # Hypertension: home medication list includes antihypertensive(s)         Disposition: Expected discharge in 1- 2days after cholecytectomy     discussed with bedside RN and patient today     Linette Pittman MD,  MD  [phone] (P)      Interval History   As per pt pain improved. Only mild tenderness on palpation of RUQ. No acute issues since yesterday. Waiting to go down to OR today     -Data reviewed today: I reviewed all new labs and imaging results over the last 24 hours. I personally reviewed no images or EKG's today.    Physical Exam   Temp: 97  F (36.1  C) Temp src: Temporal BP: (!) 159/84 Pulse: (!) 44   Resp: 16 SpO2: 95 % O2 Device: None (Room air) Oxygen Delivery: 2 LPM  Vitals:    09/15/22 0500   Weight: 96.8 kg (213 lb 6.8 oz)     Vital Signs with Ranges  Temp:  [97  F (36.1  C)-98.5  F (36.9  C)] 97  F (36.1  C)  Pulse:  [44-79] 44  Resp:  [14-18] 16  BP: (120-163)/(75-90) 159/84  SpO2:  [94 %-99 %] 95 %  I/O last 3 completed shifts:  In: 100 [I.V.:100]  Out: 0     Constitutional: Awake, alert, cooperative, no apparent distress  Respiratory: Clear to auscultation bilaterally, no crackles or wheezing  Cardiovascular: Regular rate and rhythm, normal S1 and S2, and no murmur noted  GI: Normal bowel sounds, soft, non-distended, Tender in the RUQ and epigastrium   Skin/Integumen: No rashes, no cyanosis, no edema  Other:     Medications     sodium chloride 100 mL/hr at 09/16/22 0634       [Auto Hold] latanoprost  1 drop Both Eyes At Bedtime     [Auto Hold] pantoprazole  40 mg Intravenous QAM AC     [Auto Hold] piperacillin-tazobactam  3.375 g Intravenous Q6H     [Auto Hold] sodium chloride (PF)  3 mL Intracatheter Q8H       Data   Recent Labs   Lab 09/16/22  0654 09/15/22  1143 09/15/22  0717 09/14/22  2148 09/14/22  1544   WBC  --   --  6.2  --  7.9   HGB  --   --  13.2  --  14.4   MCV  --   --  88  --  89   PLT  --   --  244  --  261     --  140 140 135   POTASSIUM 4.4 3.8 3.7 3.1* 3.2*   CHLORIDE 112*  --  110* 105 101   CO2 20  --  24 29 28   BUN 18  --  18 19 17   CR 0.96  --  1.35* 1.35* 1.32*   ANIONGAP 8  --  6 6 6   REJI 8.7  --  8.7 8.7 9.5   *  --  97 114* 101*   ALBUMIN 3.0*  --  3.2* 3.3*  3.9   PROTTOTAL 6.9  --  7.0 7.1 8.2   BILITOTAL 4.7*  --  5.9* 5.6* 5.7*   ALKPHOS 154*  --  172* 172* 203*   *  --  532* 566* 675*   *  --  204* 220* 277*   LIPASE  --   --   --  117  --        Recent Results (from the past 24 hour(s))   XR ERCP    Narrative    XR ERCP 9/15/2022 9:47 PM    HISTORY: ERCP; Images: 4  Fluorotime: 0.7 min    COMPARISON: Ultrasound 9/14/2022      Impression    IMPRESSION: Mildly dilated common bowel duct with probable stone. A  plastic biliary stent was placed. See operative report for details.    NAWAF MULLINS MD         SYSTEM ID:  [id number]

## 2022-09-16 NOTE — PROGRESS NOTES
Endoscopic ultrasound findings consistent with dilated common bile duct with multiple stones in the distal common bile duct.  ERCP consistent with the above-mentioned findings multiple stones were removed from common bile duct large amount of sludge was also removed from the common bile duct sphincterotomy was done a 5 cm long 10 and plastic stent was placed in the common bile duct with good bile flow.  Continue n.p.o. tonight plan for lap brenda tomorrow.    Artem Mora MD FACP

## 2022-09-16 NOTE — ANESTHESIA PREPROCEDURE EVALUATION
Anesthesia Pre-Procedure Evaluation    Patient: Carla Jennings   MRN: [id number] : 1965        Procedure : Procedure(s):  LAPAROSCOPIC CHOLECYSTECTOMY,          Past Medical History:   Diagnosis Date     Essential hypertension       Past Surgical History:   Procedure Laterality Date      SECTION      x2     ENDOSCOPIC RETROGRADE CHOLANGIOPANCREATOGRAM COMPLEX N/A 9/15/2022    Procedure: ENDOSCOPIC RETROGRADE CHOLANGIOPANCREATOGRAPHY, COMPLEX;  Surgeon: Artem Mora MD;  Location:  OR     ENDOSCOPIC ULTRASOUND UPPER GASTROINTESTINAL TRACT (GI) N/A 9/15/2022    Procedure: ENDOSCOPIC ULTRASOUND, ESOPHAGOSCOPY / UPPER GASTROINTESTINAL TRACT (GI), SPHINCTEROTOMY, STONE REMOVAL, STENT PLACEMENT;  Surgeon: Artem Mora MD;  Location:  OR      Allergies   Allergen Reactions     Aspirin Rash      Social History     Tobacco Use     Smoking status: Never Smoker     Smokeless tobacco: Not on file   Substance Use Topics     Alcohol use: Not Currently      Wt Readings from Last 1 Encounters:   09/15/22 96.8 kg (213 lb 6.8 oz)        Anesthesia Evaluation   Pt has had prior anesthetic.     No history of anesthetic complications       ROS/MED HX  ENT/Pulmonary:    (-) tobacco use and sleep apnea   Neurologic:    (-) no seizures and no CVA   Cardiovascular:     (+) hypertension-----    METS/Exercise Tolerance:     Hematologic:       Musculoskeletal:       GI/Hepatic:     (+) cholecystitis/cholelithiasis,  (-) GERD and liver disease   Renal/Genitourinary:    (-) renal disease   Endo:     (+) Obesity,  (-) Type II DM and thyroid disease   Psychiatric/Substance Use:       Infectious Disease:       Malignancy:       Other:            Physical Exam    Airway        Mallampati: II   TM distance: > 3 FB   Neck ROM: full   Mouth opening: > 3 cm    Respiratory Devices and Support         Dental  no notable dental history         Cardiovascular          Rhythm and rate: regular and bradycardia      Pulmonary   pulmonary exam normal                OUTSIDE LABS:  CBC:   Lab Results   Component Value Date    WBC 6.2 09/15/2022    WBC 7.9 09/14/2022    HGB 13.2 09/15/2022    HGB 14.4 09/14/2022    HCT 39.8 09/15/2022    HCT 43.5 09/14/2022     09/15/2022     09/14/2022     BMP:   Lab Results   Component Value Date     09/16/2022     09/15/2022    POTASSIUM 4.4 09/16/2022    POTASSIUM 3.8 09/15/2022    CHLORIDE 112 (H) 09/16/2022    CHLORIDE 110 (H) 09/15/2022    CO2 20 09/16/2022    CO2 24 09/15/2022    BUN 18 09/16/2022    BUN 18 09/15/2022    CR 0.96 09/16/2022    CR 1.35 (H) 09/15/2022     (H) 09/16/2022    GLC 97 09/15/2022     COAGS: No results found for: PTT, INR, FIBR  POC: No results found for: BGM, HCG, HCGS  HEPATIC:   Lab Results   Component Value Date    ALBUMIN 3.0 (L) 09/16/2022    PROTTOTAL 6.9 09/16/2022     (H) 09/16/2022     (H) 09/16/2022    ALKPHOS 154 (H) 09/16/2022    BILITOTAL 4.7 (H) 09/16/2022     OTHER:   Lab Results   Component Value Date    REJI 8.7 09/16/2022    LIPASE 117 09/14/2022       Anesthesia Plan    ASA Status:  2   NPO Status:  NPO Appropriate    Anesthesia Type: General.     - Airway: ETT   Induction: Intravenous.   Maintenance: Balanced.        Consents    Anesthesia Plan(s) and associated risks, benefits, and realistic alternatives discussed. Questions answered and patient/representative(s) expressed understanding.    - Discussed:     - Discussed with:  Patient         Postoperative Care    Pain management: Multi-modal analgesia.   PONV prophylaxis: Ondansetron (or other 5HT-3), Dexamethasone or Solumedrol, Background Propofol Infusion     Comments:                Alin Huerta MD

## 2022-09-16 NOTE — ANESTHESIA POSTPROCEDURE EVALUATION
Patient: Carla Jennings    Procedure: Procedure(s):  LAPAROSCOPIC CHOLECYSTECTOMY,       Anesthesia Type:  General    Note:     Postop Pain Control: Uneventful            Sign Out: Well controlled pain   PONV: No   Neuro/Psych: Uneventful            Sign Out: Acceptable/Baseline neuro status   Airway/Respiratory: Uneventful            Sign Out: Acceptable/Baseline resp. status   CV/Hemodynamics: Uneventful            Sign Out: Acceptable CV status; No obvious hypovolemia; No obvious fluid overload   Other NRE: NONE   DID A NON-ROUTINE EVENT OCCUR? No           Last vitals:  Vitals Value Taken Time   /79 09/16/22 1520   Temp 36.4  C (97.6  F) 09/16/22 1430   Pulse 42 09/16/22 1526   Resp 18 09/16/22 1526   SpO2 99 % 09/16/22 1526   Vitals shown include unvalidated device data.    Electronically Signed By: Alin Huerta MD  September 16, 2022  3:44 PM

## 2022-09-16 NOTE — OP NOTE
General Surgery Operative Note   PREOPERATIVE DIAGNOSIS: Cholelithiasis/choledocholithiasis/morbid obesity  POSTOPERATIVE DIAGNOSIS: Same  PROCEDURE: LAPAROSCOPIC CHOLECYSTECTOMY   ANESTHESIA: General.   PREOPERATIVE MEDICATIONS: Receiving scheduled Zosyn  SURGEON: Hayden Alarcon MD   ASSISTANT: Adrien Kennedy PA-C, Physician assistant first assistant was necessary during the performance of this procedure for expertise in patient positioning, prepping, draping, trocar placement, camera management, retraction and exposure, and suctioning.  INDICATIONS: Patient presented with signs and symptoms consistent with symptomatic gallstones as well as having elevated liver function tests.  Underwent EUS with ERCP and stone extraction.  Presents now for cholecystectomy.  Extensive discussion was undertaken regarding treatment options.  We reviewed the procedure of cholecystectomy.  Risks including but not limited to bleeding, infection, bile duct or visceral injury were all reviewed in great detail.  The patient's questions were all answered to satisfaction.  The patient wishes to proceed.  PROCEDURE: After informed consent was obtained the patient was taken to the operating suite and uneventfully endotracheally intubated. The abdomen was prepped and draped in a sterile fashion. Surgeon initiated timeout was acknowledged. A stab incision was then made within the umbilicus through which a 5mm optical trocar was used to gain access to the abdominal cavity. A CO2 pneumoperitoneum was then created. An 11mm trocar was then placed in the subxiphoid position and a 5mmport was placed in the right subcostal position.  Patient had a challenging body habitus with significant centralized obesity, BMI over 40, hepatomegaly.  We elevated the liver and were able to identify the gallbladder. The gallbladder was grasped and used to elevate the liver further. I began dissecting out some fatty adhesions down near the neck of the gallbladder until  a cystic duct was encountered. I continued the dissection using combination of sharp and blunt dissection until the cystic duct was largely dissected out. I continued the dissection up along the sides of the gallbladder, both medially and laterally, until I had created a space between the gallbladder and the liver. At this point, I encountered the cystic artery, just posterior and lateral to the cystic duct. This again was dissected out. Once I had created a window where only the cystic artery and duct were noted to be entering the gallbladder, I felt that this represented our critical view.  The cystic duct itself was quite dilated likely from the passage of the multiple stones that had been removed from her common bile duct.  Given the degree of dilation I elected to divide the duct using a stapler.  My 11 mm subxiphoid trocar was exchanged for a 12 mm port and then utilizing an endoscopic linear stapler 45 mm white load the cystic duct was divided.  I then ligated the cystic artery with clips.   I then continued the dissection up along the body of the gallbladder, freeing all attachments and adhesions of the gallbladder to the liver. Gallbladder was removed from the liver in an atraumatic fashion. The gallbladder was then brought up through the subxiphoid port site and removed from the abdomen. The gallbladder fossa was reinspected, and all areas of bleeding were managed with electrocautery. I irrigated the area with normal saline and aspirated it out. I then removed the umbilical port trocar. I then reinspected the abdomen, and everything appeared to be in pristine condition. I removed the trocars under laparoscopic visualization and desufflated the abdomen with the Venkatesh suction . The skin edges were reapproximated with 4-0 Vicryl and Steri-Strips. The patient was uneventfully extubated, awakened and taken to the PACU in stable condition. At the conclusion of the case, all lap and needle counts were  correct.   ESTIMATED BLOOD LOSS: 15cc   Hayden Alarcon MD, MD

## 2022-09-16 NOTE — ANESTHESIA CARE TRANSFER NOTE
Patient: Carla Jennings    Procedure: Procedure(s):  ENDOSCOPIC ULTRASOUND, ESOPHAGOSCOPY / UPPER GASTROINTESTINAL TRACT (GI), SPHINCTEROTOMY, STONE REMOVAL, STENT PLACEMENT  ENDOSCOPIC RETROGRADE CHOLANGIOPANCREATOGRAPHY, COMPLEX       Diagnosis: Choledocholithiasis [K80.50]  Diagnosis Additional Information: No value filed.    Anesthesia Type:   MAC     Note:    Oropharynx: oropharynx clear of all foreign objects and spontaneously breathing  Level of Consciousness: awake  Oxygen Supplementation: nasal cannula  Level of Supplemental Oxygen (L/min / FiO2): 2  Independent Airway: airway patency satisfactory and stable  Dentition: dentition unchanged  Vital Signs Stable: post-procedure vital signs reviewed and stable  Report to RN Given: handoff report given  Patient transferred to: PACU    Handoff Report: Identifed the Patient, Identified the Reponsible Provider, Reviewed the pertinent medical history, Discussed the surgical course, Reviewed Intra-OP anesthesia mangement and issues during anesthesia, Set expectations for post-procedure period and Allowed opportunity for questions and acknowledgement of understanding      Vitals:  Vitals Value Taken Time   /90    Temp     Pulse 73 09/15/22 2144   Resp 27 09/15/22 2144   SpO2 93 % 09/15/22 2144   Vitals shown include unvalidated device data.    Electronically Signed By: ARTIE Pennington CRNA  September 15, 2022  9:45 PM

## 2022-09-16 NOTE — PLAN OF CARE
Goal Outcome Evaluation:       Orientation/Cognitive: A&Ox4    Observation Goals (Met/ Not Met): Inpatient status    Mobility Level/Assist Equipment: SBA w/ IV pole    Fall Risk (Y/N): N    Behavior Concerns: NA    Pain Management: Denies pain     Tele/VS/O2: VSS on 2l NC    ABNL Lab/BG:LFTs elevated, Creat:1.35    Diet: NPO    Bowel/Bladder: Continent    Skin Concerns: Bilat LE discoloration    Drains/Devices: PIV infusing NS@100ml/hr    Tests/Procedures for next shift: Abdulaziz gutierrez      Anticipated DC date & active delays: TBD    Patient Stated Goal for Today: Get some sleep

## 2022-09-16 NOTE — PLAN OF CARE
Goal Outcome Evaluation:  Orientation/Cognitive: AxO x4  Observation Goals (Met/ Not Met): In pt status  Mobility Level/Assist Equipment: SBA  Fall Risk (Y/N): Y  Behavior Concerns: None  Pain Management: PRN diludid x1  Tele/VS/O2: Bradycardic all other VSS on 4L O2  ABNL Lab/BG: Chloride 112, ,   Diet: Clear Liquid  Bowel/Bladder: None   Skin Concerns: 3 laps sites on abdomen CDI  Drains/Devices: PIV infusing 100 NS R hand, R AC SL  Tests/Procedures for next shift: NOne  Anticipated DC date & active delays: 9/17/2022  Patient Stated Goal for Today: None

## 2022-09-17 VITALS
OXYGEN SATURATION: 98 % | HEIGHT: 60 IN | DIASTOLIC BLOOD PRESSURE: 78 MMHG | RESPIRATION RATE: 18 BRPM | BODY MASS INDEX: 41.9 KG/M2 | SYSTOLIC BLOOD PRESSURE: 137 MMHG | TEMPERATURE: 98.4 F | HEART RATE: 43 BPM | WEIGHT: 213.43 LBS

## 2022-09-17 LAB
ALBUMIN SERPL-MCNC: 3 G/DL (ref 3.4–5)
ALP SERPL-CCNC: 133 U/L (ref 40–150)
ALT SERPL W P-5'-P-CCNC: 452 U/L (ref 0–50)
ANION GAP SERPL CALCULATED.3IONS-SCNC: 6 MMOL/L (ref 3–14)
AST SERPL W P-5'-P-CCNC: 189 U/L (ref 0–45)
BILIRUB SERPL-MCNC: 3 MG/DL (ref 0.2–1.3)
BUN SERPL-MCNC: 25 MG/DL (ref 7–30)
CALCIUM SERPL-MCNC: 8.6 MG/DL (ref 8.5–10.1)
CHLORIDE BLD-SCNC: 112 MMOL/L (ref 94–109)
CO2 SERPL-SCNC: 23 MMOL/L (ref 20–32)
CREAT SERPL-MCNC: 1.01 MG/DL (ref 0.52–1.04)
ERYTHROCYTE [DISTWIDTH] IN BLOOD BY AUTOMATED COUNT: 14.6 % (ref 10–15)
GFR SERPL CREATININE-BSD FRML MDRD: 65 ML/MIN/1.73M2
GLUCOSE BLD-MCNC: 128 MG/DL (ref 70–99)
HCT VFR BLD AUTO: 38.8 % (ref 35–47)
HGB BLD-MCNC: 12.4 G/DL (ref 11.7–15.7)
MCH RBC QN AUTO: 29.1 PG (ref 26.5–33)
MCHC RBC AUTO-ENTMCNC: 32 G/DL (ref 31.5–36.5)
MCV RBC AUTO: 91 FL (ref 78–100)
PLATELET # BLD AUTO: 218 10E3/UL (ref 150–450)
POTASSIUM BLD-SCNC: 4.1 MMOL/L (ref 3.4–5.3)
PROT SERPL-MCNC: 6.8 G/DL (ref 6.8–8.8)
RBC # BLD AUTO: 4.26 10E6/UL (ref 3.8–5.2)
SODIUM SERPL-SCNC: 141 MMOL/L (ref 133–144)
WBC # BLD AUTO: 10.1 10E3/UL (ref 4–11)

## 2022-09-17 PROCEDURE — 99239 HOSP IP/OBS DSCHRG MGMT >30: CPT | Performed by: INTERNAL MEDICINE

## 2022-09-17 PROCEDURE — 85027 COMPLETE CBC AUTOMATED: CPT | Performed by: PHYSICIAN ASSISTANT

## 2022-09-17 PROCEDURE — 258N000003 HC RX IP 258 OP 636: Performed by: PHYSICIAN ASSISTANT

## 2022-09-17 PROCEDURE — 82040 ASSAY OF SERUM ALBUMIN: CPT | Performed by: PHYSICIAN ASSISTANT

## 2022-09-17 PROCEDURE — 80053 COMPREHEN METABOLIC PANEL: CPT | Performed by: PHYSICIAN ASSISTANT

## 2022-09-17 PROCEDURE — 250N000011 HC RX IP 250 OP 636: Performed by: PHYSICIAN ASSISTANT

## 2022-09-17 PROCEDURE — 36415 COLL VENOUS BLD VENIPUNCTURE: CPT | Performed by: PHYSICIAN ASSISTANT

## 2022-09-17 PROCEDURE — 250N000013 HC RX MED GY IP 250 OP 250 PS 637: Performed by: INTERNAL MEDICINE

## 2022-09-17 RX ORDER — LOSARTAN POTASSIUM AND HYDROCHLOROTHIAZIDE 12.5; 5 MG/1; MG/1
1 TABLET ORAL DAILY
COMMUNITY
Start: 2022-09-18

## 2022-09-17 RX ORDER — HYDROCODONE BITARTRATE AND ACETAMINOPHEN 5; 325 MG/1; MG/1
1 TABLET ORAL EVERY 6 HOURS PRN
Qty: 18 TABLET | Refills: 0 | Status: SHIPPED | OUTPATIENT
Start: 2022-09-17 | End: 2022-09-20

## 2022-09-17 RX ORDER — IBUPROFEN 400 MG/1
400 TABLET, FILM COATED ORAL EVERY 6 HOURS PRN
COMMUNITY
Start: 2022-09-17

## 2022-09-17 RX ORDER — PANTOPRAZOLE SODIUM 40 MG/1
40 TABLET, DELAYED RELEASE ORAL
Status: DISCONTINUED | OUTPATIENT
Start: 2022-09-17 | End: 2022-09-17 | Stop reason: HOSPADM

## 2022-09-17 RX ADMIN — SODIUM CHLORIDE: 9 INJECTION, SOLUTION INTRAVENOUS at 02:49

## 2022-09-17 RX ADMIN — PANTOPRAZOLE SODIUM 40 MG: 40 TABLET, DELAYED RELEASE ORAL at 06:44

## 2022-09-17 RX ADMIN — PIPERACILLIN AND TAZOBACTAM 3.38 G: 3; .375 INJECTION, POWDER, FOR SOLUTION INTRAVENOUS at 02:46

## 2022-09-17 RX ADMIN — PIPERACILLIN AND TAZOBACTAM 3.38 G: 3; .375 INJECTION, POWDER, FOR SOLUTION INTRAVENOUS at 07:53

## 2022-09-17 ASSESSMENT — ACTIVITIES OF DAILY LIVING (ADL)
ADLS_ACUITY_SCORE: 18
ADLS_ACUITY_SCORE: 19
ADLS_ACUITY_SCORE: 18
ADLS_ACUITY_SCORE: 18
ADLS_ACUITY_SCORE: 19

## 2022-09-17 NOTE — PLAN OF CARE
Orientation/Cognitive: A&Ox4  Observation Goals (Met/ Not Met): Inpatient status  Mobility Level/Assist Equipment: SBA w/ IV pole  Fall Risk (Y/N): N  Behavior Concerns: NA  Pain Management: Denies pain   Tele/VS/O2: VSS on 2l NC  ABNL Lab/BG:LFTs elevated, Creat:1.35  Diet:Advance Diet as Tolerated: low fiber diet  Bowel/Bladder: Continent  ear Liquid Skin Concerns: Bilat LE discoloration  Drains/Devices: PIV infusing NS@50ml/hr  Tests/Procedures for next shift: Abdulaziz huffman  Anticipated DC date & active delays: TBD  Patient Stated Goal for Today: Get some sleep

## 2022-09-17 NOTE — DISCHARGE SUMMARY
[facility]  Discharge Summary        Carla Jennings MRN# [id number]   YOB: 1965 Age: 57 year old     Date of Admission:  9/14/2022  Date of Discharge:  9/17/2022  Admitting Physician:  No admitting provider for patient encounter.  Discharge Physician: Linette Pittman MD  Discharging Service: Hospitalist     Primary Provider: No Ref-Primary, Physician  Primary Care Physician Phone Number: None         Discharge Diagnoses/Problem Oriented Hospital Course (Providers):    Carla Jennings was admitted on 9/14/2022 by No admitting provider for patient encounter. and I would refer you to their history and physical.  The following problems were addressed during her hospitalization:    Carla Jennings is a 57 year old female with a past medical history of hypertension admitted to hospital for acute cholecystitis and choledocholithiasis.     Acute cholecystitis status post laparoscopic cholecystectomy on 9/16/2022  Choledocholithiasis status post ERCP and sphincterotomy underlying 1522  Patient presenting with worsening abdominal pain present for approximately 5 days located in the epigastrium and the right upper quadrant.  In urgent care was found to have transaminitis as well as an elevated bilirubin.  Patient was sent to the emergency room where she underwent an ultrasound which revealed gallstones as well as possible wall thickening.  The common bile duct was mildly dilated at 0.8 cm  -N.p.o.  -Zosyn  -IV hydration  GI and general surgery consulted. Appreciate assistance    EUS and ERCP done by GI   EUS showed Endoscopic ultrasound findings consistent with dilated common bile duct with multiple stones in the distal common bile duct.  ERCP consistent with the above-mentioned findings multiple stones were removed from common bile duct large amount of sludge was also removed from the common bile duct sphincterotomy was done a 5 cm long 10 and plastic stent was placed in the common bile  duct with good bile flow.  Patient underwent lap brenda yesterday  Postoperatively doing well tolerating low fiber diet  Pain is well controlled     Hypokalemia  -Replace per protocol     Hypertension  Hold losartan perioperatively              Code Status []?Expand by Default     Full Code  DVT ppx: SCDs  Expected length of stay greater than 2 days           Clinically Significant Risk Factors Present on Admission                     # Hypertension: home medication list includes antihypertensive(s)          Disposition:  Home today with family in stable condition     discussed with bedside RN and patient today      Linette Pittman MD, MD  [phone] (P)             Code Status:      Full Code        Brief Hospital Stay Summary Sent Home With Patient in AVS:        Reason for your hospital stay      Choledocholithiasis and cholecystitis S/p ERCP and Laparoscopic   cholecystectomy                 Important Results:      Please see below        Pending Results:        Unresulted Labs Ordered in the Past 30 Days of this Admission     Date and Time Order Name Status Description    9/16/2022  1:15 PM Surgical Pathology Exam In process             Discharge Instructions and Follow-Up:      Follow-up Appointments     Follow-up and recommended labs and tests       Follow up with primary care provider, Physician No Ref-Primary, within 7   days for hospital follow- up.  The following labs/tests are recommended:   Recheck CMP in 1week .    F/u with General surgery in 2weeks               Discharge Disposition:      Discharged to home        Discharge Medications:        Current Discharge Medication List      START taking these medications    Details   HYDROcodone-acetaminophen (NORCO) 5-325 MG tablet Take 1 tablet by mouth every 6 hours as needed for pain  Qty: 18 tablet, Refills: 0    Associated Diagnoses: Acute cholecystitis      ibuprofen (ADVIL/MOTRIN) 400 MG tablet Take 1 tablet (400 mg) by mouth every 6 hours as needed  for moderate pain    Associated Diagnoses: Acute post-operative pain         CONTINUE these medications which have CHANGED    Details   losartan-hydrochlorothiazide (HYZAAR) 50-12.5 MG tablet Take 1 tablet by mouth daily         CONTINUE these medications which have NOT CHANGED    Details   latanoprost (XALATAN) 0.005 % ophthalmic solution Place 1 drop into both eyes At Bedtime               Allergies:         Allergies   Allergen Reactions     Aspirin Rash           Consultations This Hospital Stay:      Consultation during this admission received from gastroenterology and surgery        Condition and Physical on Discharge:      Discharge condition: Stable   Vitals: Blood pressure 137/78, pulse (!) 43, temperature 98.4  F (36.9  C), temperature source Axillary, resp. rate 18, height 1.524 m (5'), weight 96.8 kg (213 lb 6.8 oz), SpO2 98 %.     Constitutional: Awake, alert, cooperative, no apparent distress  Respiratory: Clear to auscultation bilaterally, no crackles or wheezing  Cardiovascular: Regular rate and rhythm, normal S1 and S2, and no murmur noted  GI: Normal bowel sounds, soft, non-distended,  Mildly Tender at the incisions   Skin/Integumen: No rashes, no cyanosis, no edema  Other:                Discharge Time:      Greater than 30 minutes.        Image Results From This Hospital Stay (For Non-EPIC Providers):        Results for orders placed or performed during the hospital encounter of 09/14/22   US Abdomen Limited (RUQ)    Narrative    EXAM: US ABDOMEN LIMITED  LOCATION: [facility]  DATE/TIME: 9/14/2022 8:57 PM    INDICATION: Abdominal pain. Elevated liver function tests.  COMPARISON: None.  TECHNIQUE: Limited abdominal ultrasound.    FINDINGS:    GALLBLADDER: There is shadowing echogenic material in the region of the gallbladder, likely representing a gallbladder filled with stones. The gallbladder wall is not well seen, but there may be mild associated gallbladder wall  thickening. No   pericholecystic fluid is identified. Negative sonographic Garrett sign.     BILE DUCTS: There is no intrahepatic biliary dilatation. The common duct is mildly dilated at 0.8 cm. Portions of the common duct could not be visualized due to overlying bowel gas.    LIVER: Diffuse fatty infiltration of the liver. No hepatic masses.    RIGHT KIDNEY: A 1.4 cm simple cyst in the upper pole of the right kidney would require no specific follow-up. The kidney demonstrates otherwise unremarkable echogenicity with no hydronephrosis. Normal size.     PANCREAS: A 2.6 x 0.9 cm hypoechoic structure abutting the pancreatic head most likely represents a mildly prominent peripancreatic lymph node. The visualized portions of the pancreas are otherwise unremarkable.    No ascites is seen.      Impression    IMPRESSION:  1. Shadowing echogenic material in the region of the gallbladder likely represents a gallbladder filled with stones. The gallbladder wall is not well seen, but may be mildly thickened as well. Acute cholecystitis could have this appearance. Please note,   if the patient has had a prior cholecystectomy, bowel within the gallbladder fossa could also have this appearance.  2. Hepatic steatosis.  3. The common duct is mildly dilated at 0.8 cm, with no cause identified.  4. Probable mildly prominent peripancreatic lymph node. CT could be performed for further characterization of this finding.   XR ERCP    Narrative    XR ERCP 9/15/2022 9:47 PM    HISTORY: ERCP; Images: 4  Fluorotime: 0.7 min    COMPARISON: Ultrasound 9/14/2022      Impression    IMPRESSION: Mildly dilated common bowel duct with probable stone. A  plastic biliary stent was placed. See operative report for details.    NAWAF MULLINS MD         SYSTEM ID:  [id number]             Most Recent Lab Results In EPIC (For Non-EPIC Providers):    Most Recent 3 CBC's:  Recent Labs   Lab Test 09/17/22  0742 09/15/22  0717 09/14/22  1544   WBC 10.1 6.2 7.9    HGB 12.4 13.2 14.4   MCV 91 88 89    244 261      Most Recent 3 BMP's:  Recent Labs   Lab Test 09/17/22  0742 09/16/22  0654 09/15/22  1143 09/15/22  0717    140  --  140   POTASSIUM 4.1 4.4 3.8 3.7   CHLORIDE 112* 112*  --  110*   CO2 23 20  --  24   BUN 25 18  --  18   CR 1.01 0.96  --  1.35*   ANIONGAP 6 8  --  6   REJI 8.6 8.7  --  8.7   * 121*  --  97     Most Recent 3 Troponin's:No lab results found.    Invalid input(s): TROP, TROPONINIES  Most Recent 3 INR's:No lab results found.  Most Recent 2 LFT's:  Recent Labs   Lab Test 09/17/22  0742 09/16/22  0654   * 208*   * 477*   ALKPHOS 133 154*   BILITOTAL 3.0* 4.7*     Most Recent Cholesterol Panel:No lab results found.  Most Recent 6 Bacteria Isolates From Any Culture (See EPIC Reports for Culture Details):No lab results found.  Most Recent TSH, T4 and HgbA1c: No lab results found.

## 2022-09-17 NOTE — PROGRESS NOTES
General surgery note    Doing well postop day 1 from berta gutierrez.  LFTs improving.  Tolerating diet.  Pain under control.  Prescribed some oral pain medication to go home with her.  I agree with discharge to home today.

## 2022-09-17 NOTE — PLAN OF CARE
Goal Outcome Evaluation:    Orientation/Cognitive: AxO x4  Observation Goals (Met/ Not Met): Met  Mobility Level/Assist Equipment: Ind  Fall Risk (Y/N): N  Behavior Concerns: None  Pain Management: None  Tele/VS/O2: VSS on RA  ABNL Lab/BG: Chloride 112  Diet: Low fiber  Bowel/Bladder: None  Skin Concerns: 3 lap sites, CDI  Drains/Devices: None  Tests/Procedures for next shift: None  Anticipated DC date & active delays: 9/17  Patient Stated Goal for Today: To go home     Pt discharged to home [facility] . IV removed. AVS printed and reviewed. All questions answered. Education complete. All pt belongings returned at discharge.

## 2022-09-18 LAB
ERCP: NORMAL
UPPER EUS: NORMAL

## 2022-09-19 LAB
PATH REPORT.COMMENTS IMP SPEC: NORMAL
PATH REPORT.COMMENTS IMP SPEC: NORMAL
PATH REPORT.FINAL DX SPEC: NORMAL
PATH REPORT.GROSS SPEC: NORMAL
PATH REPORT.MICROSCOPIC SPEC OTHER STN: NORMAL
PATH REPORT.RELEVANT HX SPEC: NORMAL
PHOTO IMAGE: NORMAL

## 2022-09-30 ENCOUNTER — TELEPHONE (OUTPATIENT)
Dept: SURGERY | Facility: CLINIC | Age: 57
End: 2022-09-30

## 2022-09-30 NOTE — TELEPHONE ENCOUNTER
[facility]  Post op call note - No Answer    Carla Jennings was called for an update regarding her recovery.  There was no answer and a message was left instructing the patient to call the office with any questions or concerns.     Andrez Kennedy PA-C

## 2023-04-17 ENCOUNTER — APPOINTMENT (OUTPATIENT)
Dept: GENERAL RADIOLOGY | Facility: CLINIC | Age: 58
End: 2023-04-17
Attending: INTERNAL MEDICINE
Payer: COMMERCIAL

## 2023-04-17 ENCOUNTER — ANESTHESIA (OUTPATIENT)
Dept: SURGERY | Facility: CLINIC | Age: 58
End: 2023-04-17
Payer: COMMERCIAL

## 2023-04-17 ENCOUNTER — HOSPITAL ENCOUNTER (OUTPATIENT)
Facility: CLINIC | Age: 58
Discharge: HOME | End: 2023-04-17
Attending: INTERNAL MEDICINE | Admitting: INTERNAL MEDICINE
Payer: COMMERCIAL

## 2023-04-17 ENCOUNTER — ANESTHESIA EVENT (OUTPATIENT)
Dept: SURGERY | Facility: CLINIC | Age: 58
End: 2023-04-17
Payer: COMMERCIAL

## 2023-04-17 VITALS
HEART RATE: 59 BPM | SYSTOLIC BLOOD PRESSURE: 138 MMHG | RESPIRATION RATE: 14 BRPM | BODY MASS INDEX: 40.2 KG/M2 | DIASTOLIC BLOOD PRESSURE: 76 MMHG | WEIGHT: 212.9 LBS | TEMPERATURE: 97.6 F | OXYGEN SATURATION: 98 % | HEIGHT: 61 IN

## 2023-04-17 LAB
ALBUMIN SERPL BCG-MCNC: 4.3 G/DL (ref 3.5–5.2)
ALP SERPL-CCNC: 76 U/L (ref 35–104)
ALT SERPL W P-5'-P-CCNC: 28 U/L (ref 10–35)
ANION GAP SERPL CALCULATED.3IONS-SCNC: 10 MMOL/L (ref 7–15)
AST SERPL W P-5'-P-CCNC: 26 U/L (ref 10–35)
BILIRUB SERPL-MCNC: 0.3 MG/DL
BUN SERPL-MCNC: 19.2 MG/DL (ref 6–20)
CALCIUM SERPL-MCNC: 9.3 MG/DL (ref 8.6–10)
CHLORIDE SERPL-SCNC: 106 MMOL/L (ref 98–107)
CREAT SERPL-MCNC: 0.75 MG/DL (ref 0.51–0.95)
DEPRECATED HCO3 PLAS-SCNC: 27 MMOL/L (ref 22–29)
ERCP: NORMAL
ERYTHROCYTE [DISTWIDTH] IN BLOOD BY AUTOMATED COUNT: 12.6 % (ref 10–15)
GFR SERPL CREATININE-BSD FRML MDRD: >90 ML/MIN/1.73M2
GLUCOSE SERPL-MCNC: 90 MG/DL (ref 70–99)
HCT VFR BLD AUTO: 42.3 % (ref 35–47)
HGB BLD-MCNC: 13.8 G/DL (ref 11.7–15.7)
INR PPP: 1.01 (ref 0.85–1.15)
MCH RBC QN AUTO: 29.4 PG (ref 26.5–33)
MCHC RBC AUTO-ENTMCNC: 32.6 G/DL (ref 31.5–36.5)
MCV RBC AUTO: 90 FL (ref 78–100)
PLATELET # BLD AUTO: 281 10E3/UL (ref 150–450)
POTASSIUM SERPL-SCNC: 4.2 MMOL/L (ref 3.4–5.3)
PROT SERPL-MCNC: 7.3 G/DL (ref 6.4–8.3)
RBC # BLD AUTO: 4.69 10E6/UL (ref 3.8–5.2)
SODIUM SERPL-SCNC: 143 MMOL/L (ref 136–145)
WBC # BLD AUTO: 9.7 10E3/UL (ref 4–11)

## 2023-04-17 PROCEDURE — 80053 COMPREHEN METABOLIC PANEL: CPT | Performed by: INTERNAL MEDICINE

## 2023-04-17 PROCEDURE — 999N000141 HC STATISTIC PRE-PROCEDURE NURSING ASSESSMENT: Performed by: INTERNAL MEDICINE

## 2023-04-17 PROCEDURE — 85610 PROTHROMBIN TIME: CPT | Performed by: INTERNAL MEDICINE

## 2023-04-17 PROCEDURE — 250N000011 HC RX IP 250 OP 636: Performed by: NURSE ANESTHETIST, CERTIFIED REGISTERED

## 2023-04-17 PROCEDURE — C1726 CATH, BAL DIL, NON-VASCULAR: HCPCS | Performed by: INTERNAL MEDICINE

## 2023-04-17 PROCEDURE — 360N000083 HC SURGERY LEVEL 3 W/ FLUORO, PER MIN: Performed by: INTERNAL MEDICINE

## 2023-04-17 PROCEDURE — 85027 COMPLETE CBC AUTOMATED: CPT | Performed by: INTERNAL MEDICINE

## 2023-04-17 PROCEDURE — 74330 X-RAY BILE/PANC ENDOSCOPY: CPT

## 2023-04-17 PROCEDURE — 36415 COLL VENOUS BLD VENIPUNCTURE: CPT | Performed by: INTERNAL MEDICINE

## 2023-04-17 PROCEDURE — 258N000003 HC RX IP 258 OP 636: Performed by: ANESTHESIOLOGY

## 2023-04-17 PROCEDURE — 250N000009 HC RX 250: Performed by: NURSE ANESTHETIST, CERTIFIED REGISTERED

## 2023-04-17 PROCEDURE — 710N000009 HC RECOVERY PHASE 1, LEVEL 1, PER MIN: Performed by: INTERNAL MEDICINE

## 2023-04-17 PROCEDURE — 710N000012 HC RECOVERY PHASE 2, PER MINUTE: Performed by: INTERNAL MEDICINE

## 2023-04-17 PROCEDURE — 370N000017 HC ANESTHESIA TECHNICAL FEE, PER MIN: Performed by: INTERNAL MEDICINE

## 2023-04-17 RX ORDER — LIDOCAINE 40 MG/G
CREAM TOPICAL
Status: DISCONTINUED | OUTPATIENT
Start: 2023-04-17 | End: 2023-04-17 | Stop reason: HOSPADM

## 2023-04-17 RX ORDER — NALOXONE HYDROCHLORIDE 0.4 MG/ML
0.2 INJECTION, SOLUTION INTRAMUSCULAR; INTRAVENOUS; SUBCUTANEOUS
Status: CANCELLED | OUTPATIENT
Start: 2023-04-17

## 2023-04-17 RX ORDER — NALOXONE HYDROCHLORIDE 0.4 MG/ML
0.4 INJECTION, SOLUTION INTRAMUSCULAR; INTRAVENOUS; SUBCUTANEOUS
Status: CANCELLED | OUTPATIENT
Start: 2023-04-17

## 2023-04-17 RX ORDER — PROPOFOL 10 MG/ML
INJECTION, EMULSION INTRAVENOUS PRN
Status: DISCONTINUED | OUTPATIENT
Start: 2023-04-17 | End: 2023-04-17

## 2023-04-17 RX ORDER — LIDOCAINE HYDROCHLORIDE 20 MG/ML
INJECTION, SOLUTION INFILTRATION; PERINEURAL PRN
Status: DISCONTINUED | OUTPATIENT
Start: 2023-04-17 | End: 2023-04-17

## 2023-04-17 RX ORDER — PROPOFOL 10 MG/ML
INJECTION, EMULSION INTRAVENOUS CONTINUOUS PRN
Status: DISCONTINUED | OUTPATIENT
Start: 2023-04-17 | End: 2023-04-17

## 2023-04-17 RX ORDER — ONDANSETRON 4 MG/1
4 TABLET, ORALLY DISINTEGRATING ORAL EVERY 30 MIN PRN
Status: CANCELLED | OUTPATIENT
Start: 2023-04-17

## 2023-04-17 RX ORDER — DEXMEDETOMIDINE HYDROCHLORIDE 4 UG/ML
INJECTION, SOLUTION INTRAVENOUS PRN
Status: DISCONTINUED | OUTPATIENT
Start: 2023-04-17 | End: 2023-04-17

## 2023-04-17 RX ORDER — MULTIVIT-MIN/IRON/FOLIC ACID/K 18-600-40
CAPSULE ORAL DAILY
COMMUNITY

## 2023-04-17 RX ORDER — ONDANSETRON 2 MG/ML
4 INJECTION INTRAMUSCULAR; INTRAVENOUS EVERY 6 HOURS PRN
Status: CANCELLED | OUTPATIENT
Start: 2023-04-17

## 2023-04-17 RX ORDER — FLUMAZENIL 0.1 MG/ML
0.2 INJECTION, SOLUTION INTRAVENOUS
Status: CANCELLED | OUTPATIENT
Start: 2023-04-17 | End: 2023-04-18

## 2023-04-17 RX ORDER — HYDROMORPHONE HCL IN WATER/PF 6 MG/30 ML
0.4 PATIENT CONTROLLED ANALGESIA SYRINGE INTRAVENOUS EVERY 5 MIN PRN
Status: CANCELLED | OUTPATIENT
Start: 2023-04-17

## 2023-04-17 RX ORDER — SODIUM CHLORIDE, SODIUM LACTATE, POTASSIUM CHLORIDE, CALCIUM CHLORIDE 600; 310; 30; 20 MG/100ML; MG/100ML; MG/100ML; MG/100ML
INJECTION, SOLUTION INTRAVENOUS CONTINUOUS
Status: DISCONTINUED | OUTPATIENT
Start: 2023-04-17 | End: 2023-04-17 | Stop reason: HOSPADM

## 2023-04-17 RX ORDER — SODIUM CHLORIDE, SODIUM LACTATE, POTASSIUM CHLORIDE, CALCIUM CHLORIDE 600; 310; 30; 20 MG/100ML; MG/100ML; MG/100ML; MG/100ML
INJECTION, SOLUTION INTRAVENOUS CONTINUOUS
Status: CANCELLED | OUTPATIENT
Start: 2023-04-17

## 2023-04-17 RX ORDER — HYDROMORPHONE HCL IN WATER/PF 6 MG/30 ML
0.2 PATIENT CONTROLLED ANALGESIA SYRINGE INTRAVENOUS EVERY 5 MIN PRN
Status: CANCELLED | OUTPATIENT
Start: 2023-04-17

## 2023-04-17 RX ORDER — ONDANSETRON 4 MG/1
4 TABLET, ORALLY DISINTEGRATING ORAL EVERY 6 HOURS PRN
Status: CANCELLED | OUTPATIENT
Start: 2023-04-17

## 2023-04-17 RX ORDER — ONDANSETRON 2 MG/ML
4 INJECTION INTRAMUSCULAR; INTRAVENOUS EVERY 30 MIN PRN
Status: CANCELLED | OUTPATIENT
Start: 2023-04-17

## 2023-04-17 RX ORDER — FENTANYL CITRATE 0.05 MG/ML
50 INJECTION, SOLUTION INTRAMUSCULAR; INTRAVENOUS EVERY 5 MIN PRN
Status: CANCELLED | OUTPATIENT
Start: 2023-04-17

## 2023-04-17 RX ORDER — FENTANYL CITRATE 0.05 MG/ML
25 INJECTION, SOLUTION INTRAMUSCULAR; INTRAVENOUS EVERY 5 MIN PRN
Status: CANCELLED | OUTPATIENT
Start: 2023-04-17

## 2023-04-17 RX ADMIN — PROPOFOL 150 MCG/KG/MIN: 10 INJECTION, EMULSION INTRAVENOUS at 16:22

## 2023-04-17 RX ADMIN — PROPOFOL 20 MG: 10 INJECTION, EMULSION INTRAVENOUS at 16:22

## 2023-04-17 RX ADMIN — DEXMEDETOMIDINE HYDROCHLORIDE 12 MCG: 200 INJECTION INTRAVENOUS at 16:27

## 2023-04-17 RX ADMIN — SODIUM CHLORIDE, POTASSIUM CHLORIDE, SODIUM LACTATE AND CALCIUM CHLORIDE: 600; 310; 30; 20 INJECTION, SOLUTION INTRAVENOUS at 14:12

## 2023-04-17 RX ADMIN — PROPOFOL 20 MG: 10 INJECTION, EMULSION INTRAVENOUS at 16:26

## 2023-04-17 RX ADMIN — LIDOCAINE HYDROCHLORIDE 40 MG: 20 INJECTION, SOLUTION INFILTRATION; PERINEURAL at 16:22

## 2023-04-17 ASSESSMENT — LIFESTYLE VARIABLES: TOBACCO_USE: 0

## 2023-04-17 ASSESSMENT — ACTIVITIES OF DAILY LIVING (ADL)
ADLS_ACUITY_SCORE: 35
ADLS_ACUITY_SCORE: 35

## 2023-04-17 NOTE — ANESTHESIA CARE TRANSFER NOTE
Patient: Carla Jennings    Procedure: Procedure(s):  Endoscopic retrograde cholangiopancreatogram WITH STENT REMOVAL       Diagnosis: Bile duct calculus without cholecystitis and no obstruction [K80.50]  Diagnosis Additional Information: No value filed.    Anesthesia Type:   MAC     Note:    Oropharynx: oropharynx clear of all foreign objects and spontaneously breathing  Level of Consciousness: awake  Oxygen Supplementation: room air    Independent Airway: airway patency satisfactory and stable  Dentition: dentition unchanged  Vital Signs Stable: post-procedure vital signs reviewed and stable  Report to RN Given: handoff report given  Patient transferred to: PACU    Handoff Report: Identifed the Patient, Identified the Reponsible Provider, Reviewed the pertinent medical history, Discussed the surgical course, Reviewed Intra-OP anesthesia mangement and issues during anesthesia, Set expectations for post-procedure period and Allowed opportunity for questions and acknowledgement of understanding      Vitals:  Vitals Value Taken Time   BP     Temp     Pulse     Resp 18 04/17/23 1649   SpO2 95 % 04/17/23 1649   Vitals shown include unvalidated device data.    Electronically Signed By: ARTIE Triplett CRNA  April 17, 2023  4:49 PM

## 2023-04-17 NOTE — INTERVAL H&P NOTE
"I have reviewed the surgical (or preoperative) H&P that is linked to this encounter, and examined the patient. There are no significant changes    Clinical Conditions Present on Arrival:  Clinically Significant Risk Factors Present on Admission                  # Severe Obesity: Estimated body mass index is 40.23 kg/m  as calculated from the following:    Height as of this encounter: 1.549 m (5' 1\").    Weight as of this encounter: 96.6 kg (212 lb 14.4 oz).       "

## 2023-04-17 NOTE — DISCHARGE INSTRUCTIONS
Same Day Surgery Discharge Instructions for  Sedation and General Anesthesia     It's not unusual to feel dizzy, light-headed or faint for up to 24 hours after surgery or while taking pain medication.  If you have these symptoms: sit for a few minutes before standing and have someone assist you when you get up to walk or use the bathroom.    You should rest and relax for the next 24 hours. We recommend you make arrangements to have an adult stay with you for at least 24 hours after your discharge.  Avoid hazardous and strenuous activity.    DO NOT DRIVE any vehicle or operate mechanical equipment for 24 hours following the end of your surgery.  Even though you may feel normal, your reactions may be affected by the medication you have received.    Do not drink alcoholic beverages for 24 hours following surgery.     Slowly progress to your regular diet as you feel able. It's not unusual to feel nauseated and/or vomit after receiving anesthesia.  If you develop these symptoms, drink clear liquids (apple juice, ginger ale, broth, 7-up, etc. ) until you feel better.  If your nausea and vomiting persists for 24 hours, please notify your surgeon.      All narcotic pain medications, along with inactivity and anesthesia, can cause constipation. Drinking plenty of liquids and increasing fiber intake will help.    For any questions of a medical nature, call your surgeon.    Do not make important decisions for 24 hours.    If you had general anesthesia, you may have a sore throat for a couple of days related to the breathing tube used during surgery.  You may use Cepacol lozenges to help with this discomfort.  If it worsens or if you develop a fever, contact your surgeon.     If you feel your pain is not well managed with the pain medications prescribed by your surgeon, please contact your surgeon's office to let them know so they can address your concerns.      **If you have questions or concerns about your procedure, call    Dr. Mora at [phone].**

## 2023-04-18 NOTE — ANESTHESIA POSTPROCEDURE EVALUATION
Patient: Carla Jennings    Procedure: Procedure(s):  Endoscopic retrograde cholangiopancreatogram WITH STENT REMOVAL       Anesthesia Type:  MAC    Note:  Disposition: Outpatient   Postop Pain Control: Uneventful            Sign Out: Well controlled pain   PONV: No   Neuro/Psych: Uneventful            Sign Out: Acceptable/Baseline neuro status   Airway/Respiratory: Uneventful            Sign Out: Acceptable/Baseline resp. status   CV/Hemodynamics: Uneventful            Sign Out: Acceptable CV status; No obvious hypovolemia; No obvious fluid overload   Other NRE: NONE   DID A NON-ROUTINE EVENT OCCUR? No           Last vitals:  Vitals Value Taken Time   /84 04/17/23 1715   Temp 36.4  C (97.6  F) 04/17/23 1715   Pulse 60 04/17/23 1718   Resp 11 04/17/23 1718   SpO2 96 % 04/17/23 1718   Vitals shown include unvalidated device data.    Electronically Signed By: Torsten Jennings DO  April 17, 2023  7:11 PM

## (undated) DEVICE — SYR 10ML LL W/O NDL 302995

## (undated) DEVICE — PACK LAP CHOLE SLC15LCFSD

## (undated) DEVICE — GOWN IMPERVIOUS SPECIALTY XLG/XLONG 32474

## (undated) DEVICE — DRSG GAUZE 4X4" 3033

## (undated) DEVICE — Device

## (undated) DEVICE — SU VICRYL 0 UR-6 27" J603H

## (undated) DEVICE — CATH RETRIEVAL BALLOON EXTRACTOR PRO RX-S INJ ABOVE 9-12MM

## (undated) DEVICE — ENDO SNARE POLYPECTOMY OVAL 15MM LOOP SD-240U-15

## (undated) DEVICE — SUCTION CANISTER MEDIVAC LINER 3000ML W/LID 65651-530

## (undated) DEVICE — EVAC SYSTEM CLEAR FLOW SC082500

## (undated) DEVICE — DEVICE SUTURE PASSER 14GA WECK EFX EFXSP2

## (undated) DEVICE — ANTIFOG SOLUTION W/FOAM PAD 31142527

## (undated) DEVICE — APPLIER ENDO CLIP APPLIED MED UNIVERSAL 5MMX34CM LF CA500

## (undated) DEVICE — ESU GROUND PAD UNIVERSAL W/O CORD

## (undated) DEVICE — LINEN TOWEL PACK X5 5464

## (undated) DEVICE — ENDO TROCAR FIRST ENTRY KII FIOS Z-THRD 11X100MM CTF33

## (undated) DEVICE — ENDO TROCAR FIRST ENTRY KII FIOS Z-THRD 12X100MM CTF73

## (undated) DEVICE — ENDO TROCAR SLEEVE KII Z-THREADED 05X100MM CTS02

## (undated) DEVICE — POUCH TISSUE RETRIEVAL LAP 10MM 2.21" INTRO TRS100SB2

## (undated) DEVICE — ENDO TROCAR FIRST ENTRY KII FIOS Z-THRD 05X100MM CTF03

## (undated) DEVICE — ESU GROUND PAD ADULT W/CORD E7507

## (undated) DEVICE — GLOVE PROTEXIS W/NEU-THERA 8.0  2D73TE80

## (undated) DEVICE — SOL WATER IRRIG 1000ML BOTTLE 2F7114

## (undated) DEVICE — NDL INSUFFLATION 13GA 120MM C2201

## (undated) DEVICE — ENDO SCOPE WARMER LF TM500

## (undated) DEVICE — SOL NACL 0.9% INJ 1000ML BAG 2B1324X

## (undated) DEVICE — PREP CHLORAPREP 26ML TINTED HI-LITE ORANGE 930815

## (undated) DEVICE — SOL NACL 0.9% IRRIG 1000ML BOTTLE 2F7124

## (undated) DEVICE — STPL POWERED ECHELON 45MM PSEE45A

## (undated) DEVICE — SU VICRYL 4-0 PS-2 18" UND J496H

## (undated) DEVICE — ENDO BITE BLOCK 60 MAXI LF 00712804

## (undated) DEVICE — SUCTION IRR STRYKERFLOW II W/TIP 250-070-520

## (undated) DEVICE — ESU HOLDER LAP INST DISP PURPLE LONG 330MM H-PRO-330

## (undated) DEVICE — RAD RX ISOVUE 300 (50ML) 61% IOPAMIDOL CHARGE PER ML

## (undated) RX ORDER — LIDOCAINE HYDROCHLORIDE 20 MG/ML
INJECTION, SOLUTION EPIDURAL; INFILTRATION; INTRACAUDAL; PERINEURAL
Status: DISPENSED
Start: 2022-09-15

## (undated) RX ORDER — ONDANSETRON 2 MG/ML
INJECTION INTRAMUSCULAR; INTRAVENOUS
Status: DISPENSED
Start: 2022-09-15

## (undated) RX ORDER — KETOROLAC TROMETHAMINE 15 MG/ML
INJECTION, SOLUTION INTRAMUSCULAR; INTRAVENOUS
Status: DISPENSED
Start: 2022-09-16

## (undated) RX ORDER — FENTANYL CITRATE 0.05 MG/ML
INJECTION, SOLUTION INTRAMUSCULAR; INTRAVENOUS
Status: DISPENSED
Start: 2022-09-16

## (undated) RX ORDER — BUPIVACAINE HYDROCHLORIDE AND EPINEPHRINE 2.5; 5 MG/ML; UG/ML
INJECTION, SOLUTION EPIDURAL; INFILTRATION; INTRACAUDAL; PERINEURAL
Status: DISPENSED
Start: 2022-09-16

## (undated) RX ORDER — PROPOFOL 10 MG/ML
INJECTION, EMULSION INTRAVENOUS
Status: DISPENSED
Start: 2022-09-15

## (undated) RX ORDER — FENTANYL CITRATE 50 UG/ML
INJECTION, SOLUTION INTRAMUSCULAR; INTRAVENOUS
Status: DISPENSED
Start: 2022-09-16

## (undated) RX ORDER — PROPOFOL 10 MG/ML
INJECTION, EMULSION INTRAVENOUS
Status: DISPENSED
Start: 2023-04-17

## (undated) RX ORDER — DEXAMETHASONE SODIUM PHOSPHATE 4 MG/ML
INJECTION, SOLUTION INTRA-ARTICULAR; INTRALESIONAL; INTRAMUSCULAR; INTRAVENOUS; SOFT TISSUE
Status: DISPENSED
Start: 2022-09-15

## (undated) RX ORDER — LIDOCAINE HYDROCHLORIDE 20 MG/ML
INJECTION, SOLUTION EPIDURAL; INFILTRATION; INTRACAUDAL; PERINEURAL
Status: DISPENSED
Start: 2022-09-16

## (undated) RX ORDER — DEXAMETHASONE SODIUM PHOSPHATE 4 MG/ML
INJECTION, SOLUTION INTRA-ARTICULAR; INTRALESIONAL; INTRAMUSCULAR; INTRAVENOUS; SOFT TISSUE
Status: DISPENSED
Start: 2022-09-16

## (undated) RX ORDER — ONDANSETRON 2 MG/ML
INJECTION INTRAMUSCULAR; INTRAVENOUS
Status: DISPENSED
Start: 2022-09-16

## (undated) RX ORDER — FENTANYL CITRATE 50 UG/ML
INJECTION, SOLUTION INTRAMUSCULAR; INTRAVENOUS
Status: DISPENSED
Start: 2023-04-17

## (undated) RX ORDER — PIPERACILLIN SODIUM, TAZOBACTAM SODIUM 3; .375 G/15ML; G/15ML
INJECTION, POWDER, LYOPHILIZED, FOR SOLUTION INTRAVENOUS
Status: DISPENSED
Start: 2022-09-16